# Patient Record
Sex: FEMALE | Race: WHITE | Employment: OTHER | URBAN - METROPOLITAN AREA
[De-identification: names, ages, dates, MRNs, and addresses within clinical notes are randomized per-mention and may not be internally consistent; named-entity substitution may affect disease eponyms.]

---

## 2017-01-03 ENCOUNTER — HOSPITAL ENCOUNTER (OUTPATIENT)
Dept: NON INVASIVE DIAGNOSTICS | Age: 72
Discharge: HOME | End: 2017-01-03
Admitting: INTERNAL MEDICINE

## 2017-01-03 DIAGNOSIS — R09.89 CAROTID BRUIT, UNSPECIFIED LATERALITY: ICD-10-CM

## 2017-01-03 DIAGNOSIS — R09.89 OTHER SPECIFIED SYMPTOMS AND SIGNS INVOLVING THE CIRCULATORY AND RESPIRATORY SYSTEMS: ICD-10-CM

## 2017-01-03 DIAGNOSIS — R53.1 RIGHT SIDED WEAKNESS: Primary | ICD-10-CM

## 2017-01-03 LAB
LV EF: 58 %
LVEF MODALITY: NORMAL

## 2017-03-23 ENCOUNTER — TELEPHONE (OUTPATIENT)
Dept: ORTHOPEDIC SURGERY | Age: 72
End: 2017-03-23

## 2017-03-23 PROBLEM — M25.562 CHRONIC PAIN OF LEFT KNEE: Status: ACTIVE | Noted: 2017-03-23

## 2017-03-23 PROBLEM — G89.29 CHRONIC PAIN OF LEFT KNEE: Status: ACTIVE | Noted: 2017-03-23

## 2017-03-23 PROBLEM — Z96.651 STATUS POST REVISION OF TOTAL REPLACEMENT OF RIGHT KNEE: Status: ACTIVE | Noted: 2017-03-23

## 2017-03-23 PROBLEM — M17.12 PRIMARY OSTEOARTHRITIS OF LEFT KNEE: Status: ACTIVE | Noted: 2017-03-23

## 2017-03-23 PROBLEM — Z96.651 STATUS POST TOTAL RIGHT KNEE REPLACEMENT: Status: ACTIVE | Noted: 2017-03-23

## 2017-03-23 PROBLEM — E66.01 MORBID OBESITY WITH BMI OF 40.0-44.9, ADULT (HCC): Status: ACTIVE | Noted: 2017-03-23

## 2017-03-28 ENCOUNTER — TELEPHONE (OUTPATIENT)
Dept: INTERNAL MEDICINE CLINIC | Age: 72
End: 2017-03-28

## 2017-03-30 ENCOUNTER — TELEPHONE (OUTPATIENT)
Dept: INTERNAL MEDICINE CLINIC | Age: 72
End: 2017-03-30

## 2017-03-30 ENCOUNTER — OFFICE VISIT (OUTPATIENT)
Dept: INTERNAL MEDICINE CLINIC | Age: 72
End: 2017-03-30

## 2017-03-30 VITALS
DIASTOLIC BLOOD PRESSURE: 63 MMHG | OXYGEN SATURATION: 99 % | HEART RATE: 66 BPM | SYSTOLIC BLOOD PRESSURE: 132 MMHG | TEMPERATURE: 96.2 F

## 2017-03-30 DIAGNOSIS — F32.5 MAJOR DEPRESSIVE DISORDER WITH SINGLE EPISODE, IN FULL REMISSION (HCC): ICD-10-CM

## 2017-03-30 DIAGNOSIS — E78.00 PURE HYPERCHOLESTEROLEMIA: ICD-10-CM

## 2017-03-30 DIAGNOSIS — E07.9 THYROID DISEASE: ICD-10-CM

## 2017-03-30 DIAGNOSIS — G31.85 CORTICOBASAL DEGENERATION (HCC): ICD-10-CM

## 2017-03-30 DIAGNOSIS — E66.01 MORBID OBESITY DUE TO EXCESS CALORIES (HCC): ICD-10-CM

## 2017-03-30 DIAGNOSIS — I25.10 CORONARY ARTERY DISEASE INVOLVING NATIVE CORONARY ARTERY OF NATIVE HEART WITHOUT ANGINA PECTORIS: ICD-10-CM

## 2017-03-30 DIAGNOSIS — Z00.00 PREVENTATIVE HEALTH CARE: ICD-10-CM

## 2017-03-30 DIAGNOSIS — G89.29 CHRONIC PAIN OF BOTH KNEES: ICD-10-CM

## 2017-03-30 DIAGNOSIS — M25.561 CHRONIC PAIN OF BOTH KNEES: ICD-10-CM

## 2017-03-30 DIAGNOSIS — M25.562 CHRONIC PAIN OF BOTH KNEES: ICD-10-CM

## 2017-03-30 DIAGNOSIS — Z12.31 ENCOUNTER FOR SCREENING MAMMOGRAM FOR MALIGNANT NEOPLASM OF BREAST: ICD-10-CM

## 2017-03-30 DIAGNOSIS — I10 ESSENTIAL HYPERTENSION: Primary | ICD-10-CM

## 2017-03-30 DIAGNOSIS — K21.9 GASTROESOPHAGEAL REFLUX DISEASE, ESOPHAGITIS PRESENCE NOT SPECIFIED: ICD-10-CM

## 2017-03-30 LAB
ALBUMIN SERPL-MCNC: 4.2 G/DL (ref 3.4–5)
ANION GAP SERPL CALCULATED.3IONS-SCNC: 12 MMOL/L (ref 3–16)
BASOPHILS ABSOLUTE: 0 K/UL (ref 0–0.2)
BASOPHILS RELATIVE PERCENT: 0.7 %
BUN BLDV-MCNC: 21 MG/DL (ref 7–20)
CALCIUM SERPL-MCNC: 9.1 MG/DL (ref 8.3–10.6)
CHLORIDE BLD-SCNC: 100 MMOL/L (ref 99–110)
CHOLESTEROL, TOTAL: 190 MG/DL (ref 0–199)
CO2: 29 MMOL/L (ref 21–32)
CREAT SERPL-MCNC: 0.7 MG/DL (ref 0.6–1.2)
EOSINOPHILS ABSOLUTE: 0.2 K/UL (ref 0–0.6)
EOSINOPHILS RELATIVE PERCENT: 2.9 %
GFR AFRICAN AMERICAN: >60
GFR NON-AFRICAN AMERICAN: >60
GLUCOSE BLD-MCNC: 113 MG/DL (ref 70–99)
HCT VFR BLD CALC: 41.2 % (ref 36–48)
HDLC SERPL-MCNC: 66 MG/DL (ref 40–60)
HEMOGLOBIN: 13.7 G/DL (ref 12–16)
HEPATITIS C ANTIBODY INTERPRETATION: NORMAL
LDL CHOLESTEROL CALCULATED: 85 MG/DL
LYMPHOCYTES ABSOLUTE: 1.6 K/UL (ref 1–5.1)
LYMPHOCYTES RELATIVE PERCENT: 23.9 %
MCH RBC QN AUTO: 30.4 PG (ref 26–34)
MCHC RBC AUTO-ENTMCNC: 33.3 G/DL (ref 31–36)
MCV RBC AUTO: 91.4 FL (ref 80–100)
MONOCYTES ABSOLUTE: 0.5 K/UL (ref 0–1.3)
MONOCYTES RELATIVE PERCENT: 7.1 %
NEUTROPHILS ABSOLUTE: 4.4 K/UL (ref 1.7–7.7)
NEUTROPHILS RELATIVE PERCENT: 65.4 %
PDW BLD-RTO: 13.8 % (ref 12.4–15.4)
PHOSPHORUS: 3.9 MG/DL (ref 2.5–4.9)
PLATELET # BLD: 232 K/UL (ref 135–450)
PMV BLD AUTO: 9.3 FL (ref 5–10.5)
POTASSIUM SERPL-SCNC: 4.8 MMOL/L (ref 3.5–5.1)
RBC # BLD: 4.51 M/UL (ref 4–5.2)
SODIUM BLD-SCNC: 141 MMOL/L (ref 136–145)
TRIGL SERPL-MCNC: 197 MG/DL (ref 0–150)
TSH SERPL DL<=0.05 MIU/L-ACNC: 2.33 UIU/ML (ref 0.27–4.2)
VLDLC SERPL CALC-MCNC: 39 MG/DL
WBC # BLD: 6.8 K/UL (ref 4–11)

## 2017-03-30 PROCEDURE — G0008 ADMIN INFLUENZA VIRUS VAC: HCPCS | Performed by: INTERNAL MEDICINE

## 2017-03-30 PROCEDURE — 99204 OFFICE O/P NEW MOD 45 MIN: CPT | Performed by: INTERNAL MEDICINE

## 2017-03-30 PROCEDURE — 90686 IIV4 VACC NO PRSV 0.5 ML IM: CPT | Performed by: INTERNAL MEDICINE

## 2017-03-30 RX ORDER — DONEPEZIL HYDROCHLORIDE 10 MG/1
5 TABLET, FILM COATED ORAL NIGHTLY
Qty: 45 TABLET | Refills: 1 | Status: SHIPPED | OUTPATIENT
Start: 2017-03-30 | End: 2017-07-31

## 2017-03-30 RX ORDER — DONEPEZIL HYDROCHLORIDE 10 MG/1
10 TABLET, FILM COATED ORAL NIGHTLY
Qty: 90 TABLET | Refills: 1 | Status: SHIPPED | OUTPATIENT
Start: 2017-03-30 | End: 2017-03-30 | Stop reason: DRUGHIGH

## 2017-03-30 RX ORDER — ELECTROLYTES/DEXTROSE
1 SOLUTION, ORAL ORAL DAILY
Qty: 90 TABLET | Refills: 1 | Status: SHIPPED | OUTPATIENT
Start: 2017-03-30 | End: 2017-10-24 | Stop reason: SDUPTHER

## 2017-03-30 RX ORDER — LEVOTHYROXINE SODIUM 112 UG/1
112 TABLET ORAL DAILY
Qty: 90 TABLET | Refills: 1 | Status: SHIPPED | OUTPATIENT
Start: 2017-03-30 | End: 2017-11-13 | Stop reason: SDUPTHER

## 2017-03-30 RX ORDER — ASPIRIN 81 MG/1
81 TABLET ORAL DAILY
Qty: 90 TABLET | Refills: 1 | Status: ON HOLD | OUTPATIENT
Start: 2017-03-30 | End: 2017-12-24 | Stop reason: HOSPADM

## 2017-03-30 RX ORDER — LISINOPRIL 40 MG/1
40 TABLET ORAL DAILY
Qty: 90 TABLET | Refills: 1 | Status: SHIPPED | OUTPATIENT
Start: 2017-03-30 | End: 2017-11-01 | Stop reason: SDUPTHER

## 2017-03-30 RX ORDER — MELOXICAM 15 MG/1
15 TABLET ORAL DAILY PRN
Qty: 90 TABLET | Refills: 1 | Status: SHIPPED | OUTPATIENT
Start: 2017-03-30 | End: 2017-05-01

## 2017-03-30 RX ORDER — CHOLECALCIFEROL (VITAMIN D3) 125 MCG
1 CAPSULE ORAL DAILY
Qty: 90 TABLET | Refills: 3 | Status: SHIPPED | OUTPATIENT
Start: 2017-03-30 | End: 2018-02-12 | Stop reason: SDUPTHER

## 2017-03-30 RX ORDER — GLUCOSAMINE HCL 500 MG
1 TABLET ORAL DAILY
Qty: 90 TABLET | Refills: 1 | Status: SHIPPED | OUTPATIENT
Start: 2017-03-30 | End: 2017-03-30 | Stop reason: DRUGHIGH

## 2017-03-30 RX ORDER — SOTALOL HYDROCHLORIDE 80 MG/1
80 TABLET ORAL DAILY
Qty: 90 TABLET | Refills: 1 | Status: SHIPPED | OUTPATIENT
Start: 2017-03-30 | End: 2017-08-30 | Stop reason: SDUPTHER

## 2017-03-30 RX ORDER — CALCIUM CARBONATE 200(500)MG
1 TABLET,CHEWABLE ORAL 2 TIMES DAILY WITH MEALS
Qty: 180 TABLET | Refills: 5 | Status: ON HOLD | OUTPATIENT
Start: 2017-03-30 | End: 2017-12-22 | Stop reason: CLARIF

## 2017-03-30 RX ORDER — ATORVASTATIN CALCIUM 20 MG/1
20 TABLET, FILM COATED ORAL DAILY
Qty: 90 TABLET | Refills: 1 | Status: SHIPPED | OUTPATIENT
Start: 2017-03-30 | End: 2017-05-15 | Stop reason: SDUPTHER

## 2017-03-30 RX ORDER — TRIAMTERENE AND HYDROCHLOROTHIAZIDE 37.5; 25 MG/1; MG/1
1 CAPSULE ORAL EVERY MORNING
Qty: 90 CAPSULE | Refills: 1 | Status: SHIPPED | OUTPATIENT
Start: 2017-03-30 | End: 2017-07-31 | Stop reason: ALTCHOICE

## 2017-03-30 RX ORDER — OMEGA-3S/DHA/EPA/FISH OIL/D3 300MG-1000
1 CAPSULE ORAL
Qty: 90 CAPSULE | Refills: 1 | Status: ON HOLD | OUTPATIENT
Start: 2017-03-30 | End: 2017-12-22 | Stop reason: CLARIF

## 2017-03-30 RX ORDER — DULOXETIN HYDROCHLORIDE 60 MG/1
60 CAPSULE, DELAYED RELEASE ORAL DAILY
Qty: 90 CAPSULE | Refills: 1 | Status: SHIPPED | OUTPATIENT
Start: 2017-03-30 | End: 2017-11-13 | Stop reason: SDUPTHER

## 2017-03-30 RX ORDER — ACETAMINOPHEN 500 MG
1000 TABLET ORAL EVERY 6 HOURS PRN
Qty: 225 TABLET | Refills: 3 | Status: SHIPPED | OUTPATIENT
Start: 2017-03-30 | End: 2018-02-12 | Stop reason: SDUPTHER

## 2017-03-30 RX ORDER — OMEPRAZOLE 20 MG/1
20 CAPSULE, DELAYED RELEASE ORAL DAILY
Qty: 90 CAPSULE | Refills: 1 | Status: SHIPPED | OUTPATIENT
Start: 2017-03-30 | End: 2017-10-27 | Stop reason: SDUPTHER

## 2017-04-03 ENCOUNTER — TELEPHONE (OUTPATIENT)
Dept: PAIN MANAGEMENT | Age: 72
End: 2017-04-03

## 2017-04-05 ENCOUNTER — HOSPITAL ENCOUNTER (OUTPATIENT)
Dept: MAMMOGRAPHY | Age: 72
Discharge: HOME | End: 2017-04-05
Attending: INTERNAL MEDICINE | Admitting: INTERNAL MEDICINE

## 2017-04-05 DIAGNOSIS — Z12.31 ENCOUNTER FOR SCREENING MAMMOGRAM FOR MALIGNANT NEOPLASM OF BREAST: ICD-10-CM

## 2017-04-05 DIAGNOSIS — Z00.00 PREVENTATIVE HEALTH CARE: ICD-10-CM

## 2017-04-13 ENCOUNTER — NURSE ONLY (OUTPATIENT)
Dept: INTERNAL MEDICINE CLINIC | Age: 72
End: 2017-04-13

## 2017-04-13 DIAGNOSIS — Z00.00 PREVENTATIVE HEALTH CARE: ICD-10-CM

## 2017-04-13 LAB
CONTROL: NEGATIVE
HEMOCCULT STL QL: NEGATIVE

## 2017-04-13 PROCEDURE — 82274 ASSAY TEST FOR BLOOD FECAL: CPT | Performed by: INTERNAL MEDICINE

## 2017-04-28 ENCOUNTER — TELEPHONE (OUTPATIENT)
Dept: ORTHOPEDIC SURGERY | Age: 72
End: 2017-04-28

## 2017-05-01 ENCOUNTER — OFFICE VISIT (OUTPATIENT)
Dept: PAIN MANAGEMENT | Age: 72
End: 2017-05-01

## 2017-05-01 VITALS
DIASTOLIC BLOOD PRESSURE: 78 MMHG | WEIGHT: 250 LBS | BODY MASS INDEX: 42.68 KG/M2 | HEIGHT: 64 IN | SYSTOLIC BLOOD PRESSURE: 135 MMHG | HEART RATE: 77 BPM

## 2017-05-01 DIAGNOSIS — M79.2 NEUROPATHIC PAIN: ICD-10-CM

## 2017-05-01 DIAGNOSIS — G89.4 CHRONIC PAIN SYNDROME: ICD-10-CM

## 2017-05-01 DIAGNOSIS — M17.12 PRIMARY OSTEOARTHRITIS OF LEFT KNEE: ICD-10-CM

## 2017-05-01 DIAGNOSIS — E66.9 OBESITY, UNSPECIFIED OBESITY SEVERITY, UNSPECIFIED OBESITY TYPE: ICD-10-CM

## 2017-05-01 PROCEDURE — 99204 OFFICE O/P NEW MOD 45 MIN: CPT | Performed by: INTERNAL MEDICINE

## 2017-05-01 RX ORDER — ISOSORBIDE MONONITRATE 30 MG/1
30 TABLET, EXTENDED RELEASE ORAL DAILY
COMMUNITY
End: 2017-05-24 | Stop reason: SDUPTHER

## 2017-05-01 RX ORDER — PREGABALIN 50 MG/1
CAPSULE ORAL
Qty: 90 CAPSULE | Refills: 0 | Status: SHIPPED | OUTPATIENT
Start: 2017-05-01 | End: 2017-05-01

## 2017-05-10 ENCOUNTER — TELEPHONE (OUTPATIENT)
Dept: INTERNAL MEDICINE CLINIC | Age: 72
End: 2017-05-10

## 2017-05-15 ENCOUNTER — OFFICE VISIT (OUTPATIENT)
Dept: CARDIOLOGY CLINIC | Age: 72
End: 2017-05-15

## 2017-05-15 VITALS
HEART RATE: 70 BPM | SYSTOLIC BLOOD PRESSURE: 130 MMHG | BODY MASS INDEX: 44.3 KG/M2 | DIASTOLIC BLOOD PRESSURE: 64 MMHG | HEIGHT: 63 IN | WEIGHT: 250 LBS

## 2017-05-15 DIAGNOSIS — E78.5 HYPERLIPIDEMIA, UNSPECIFIED HYPERLIPIDEMIA TYPE: ICD-10-CM

## 2017-05-15 DIAGNOSIS — I25.10 CORONARY ARTERY DISEASE INVOLVING NATIVE CORONARY ARTERY OF NATIVE HEART WITHOUT ANGINA PECTORIS: Primary | ICD-10-CM

## 2017-05-15 DIAGNOSIS — I10 ESSENTIAL HYPERTENSION: ICD-10-CM

## 2017-05-15 PROCEDURE — 93000 ELECTROCARDIOGRAM COMPLETE: CPT | Performed by: INTERNAL MEDICINE

## 2017-05-15 PROCEDURE — 99204 OFFICE O/P NEW MOD 45 MIN: CPT | Performed by: INTERNAL MEDICINE

## 2017-05-15 RX ORDER — ATORVASTATIN CALCIUM 40 MG/1
40 TABLET, FILM COATED ORAL DAILY
Qty: 60 TABLET | Refills: 3 | Status: SHIPPED | OUTPATIENT
Start: 2017-05-15 | End: 2017-11-13 | Stop reason: SDUPTHER

## 2017-05-24 RX ORDER — ISOSORBIDE MONONITRATE 30 MG/1
30 TABLET, EXTENDED RELEASE ORAL DAILY
Qty: 30 TABLET | Refills: 5 | Status: SHIPPED | OUTPATIENT
Start: 2017-05-24 | End: 2017-11-28 | Stop reason: SDUPTHER

## 2017-06-29 ENCOUNTER — TELEPHONE (OUTPATIENT)
Dept: INTERNAL MEDICINE CLINIC | Age: 72
End: 2017-06-29

## 2017-06-30 DIAGNOSIS — G83.9 SPASTIC PARALYSIS (HCC): Primary | ICD-10-CM

## 2017-07-31 ENCOUNTER — OFFICE VISIT (OUTPATIENT)
Dept: INTERNAL MEDICINE CLINIC | Age: 72
End: 2017-07-31

## 2017-07-31 ENCOUNTER — TELEPHONE (OUTPATIENT)
Dept: INTERNAL MEDICINE CLINIC | Age: 72
End: 2017-07-31

## 2017-07-31 VITALS — HEART RATE: 62 BPM | OXYGEN SATURATION: 94 % | SYSTOLIC BLOOD PRESSURE: 141 MMHG | DIASTOLIC BLOOD PRESSURE: 74 MMHG

## 2017-07-31 DIAGNOSIS — I10 ESSENTIAL HYPERTENSION: Primary | ICD-10-CM

## 2017-07-31 DIAGNOSIS — R05.9 COUGH: ICD-10-CM

## 2017-07-31 DIAGNOSIS — I50.32 CHRONIC DIASTOLIC CHF (CONGESTIVE HEART FAILURE) (HCC): ICD-10-CM

## 2017-07-31 PROCEDURE — 99214 OFFICE O/P EST MOD 30 MIN: CPT | Performed by: INTERNAL MEDICINE

## 2017-07-31 RX ORDER — SPIRONOLACTONE 25 MG/1
25 TABLET ORAL DAILY
Qty: 30 TABLET | Refills: 3 | Status: SHIPPED | OUTPATIENT
Start: 2017-07-31 | End: 2017-08-23

## 2017-07-31 RX ORDER — TORSEMIDE 20 MG/1
20 TABLET ORAL DAILY
Qty: 30 TABLET | Refills: 3 | Status: SHIPPED | OUTPATIENT
Start: 2017-07-31 | End: 2017-08-23

## 2017-07-31 ASSESSMENT — ENCOUNTER SYMPTOMS
SHORTNESS OF BREATH: 1
COUGH: 1
TROUBLE SWALLOWING: 1

## 2017-08-02 ENCOUNTER — TELEPHONE (OUTPATIENT)
Dept: INTERNAL MEDICINE CLINIC | Age: 72
End: 2017-08-02

## 2017-08-02 DIAGNOSIS — G31.85 CORTICOBASAL DEGENERATION (HCC): Primary | ICD-10-CM

## 2017-08-03 ENCOUNTER — HOSPITAL ENCOUNTER (OUTPATIENT)
Dept: OTHER | Age: 72
Discharge: HOME | End: 2017-08-03
Attending: INTERNAL MEDICINE | Admitting: INTERNAL MEDICINE

## 2017-08-03 DIAGNOSIS — R05.9 COUGH: ICD-10-CM

## 2017-08-04 ENCOUNTER — TELEPHONE (OUTPATIENT)
Dept: INTERNAL MEDICINE CLINIC | Age: 72
End: 2017-08-04

## 2017-08-04 DIAGNOSIS — G31.85 CORTICOBASAL DEGENERATION (HCC): Primary | ICD-10-CM

## 2017-08-08 ENCOUNTER — TELEPHONE (OUTPATIENT)
Dept: INTERNAL MEDICINE CLINIC | Age: 72
End: 2017-08-08

## 2017-08-22 ENCOUNTER — TELEPHONE (OUTPATIENT)
Dept: INTERNAL MEDICINE CLINIC | Age: 72
End: 2017-08-22

## 2017-08-23 ENCOUNTER — TELEPHONE (OUTPATIENT)
Dept: INTERNAL MEDICINE CLINIC | Age: 72
End: 2017-08-23

## 2017-08-23 RX ORDER — TRIAMTERENE AND HYDROCHLOROTHIAZIDE 37.5; 25 MG/1; MG/1
1 CAPSULE ORAL DAILY
Qty: 90 CAPSULE | Refills: 1 | Status: SHIPPED | OUTPATIENT
Start: 2017-08-23 | End: 2017-10-27 | Stop reason: SDUPTHER

## 2017-08-24 RX ORDER — CUSHION
1 EACH MISCELLANEOUS DAILY
Qty: 1 EACH | Refills: 0 | Status: SHIPPED | OUTPATIENT
Start: 2017-08-24

## 2017-08-30 RX ORDER — SOTALOL HYDROCHLORIDE 80 MG/1
TABLET ORAL
Qty: 90 TABLET | Refills: 1 | Status: SHIPPED | OUTPATIENT
Start: 2017-08-30 | End: 2017-11-13 | Stop reason: SDUPTHER

## 2017-09-08 ENCOUNTER — OFFICE VISIT (OUTPATIENT)
Dept: CARDIOLOGY CLINIC | Age: 72
End: 2017-09-08

## 2017-09-08 VITALS — SYSTOLIC BLOOD PRESSURE: 100 MMHG | HEART RATE: 75 BPM | DIASTOLIC BLOOD PRESSURE: 60 MMHG

## 2017-09-08 DIAGNOSIS — E66.01 MORBID OBESITY WITH BMI OF 40.0-44.9, ADULT (HCC): ICD-10-CM

## 2017-09-08 DIAGNOSIS — I25.10 CORONARY ARTERY DISEASE INVOLVING NATIVE CORONARY ARTERY OF NATIVE HEART WITHOUT ANGINA PECTORIS: ICD-10-CM

## 2017-09-08 DIAGNOSIS — G31.85 CORTICOBASAL DEGENERATION (HCC): ICD-10-CM

## 2017-09-08 DIAGNOSIS — I10 ESSENTIAL HYPERTENSION: ICD-10-CM

## 2017-09-08 DIAGNOSIS — R93.89 ABNORMAL CHEST X-RAY: Primary | ICD-10-CM

## 2017-09-08 PROCEDURE — 99215 OFFICE O/P EST HI 40 MIN: CPT | Performed by: INTERNAL MEDICINE

## 2017-09-13 ENCOUNTER — HOSPITAL ENCOUNTER (OUTPATIENT)
Dept: CT IMAGING | Age: 72
Discharge: HOME | End: 2017-09-13
Attending: INTERNAL MEDICINE | Admitting: INTERNAL MEDICINE

## 2017-09-13 DIAGNOSIS — R93.89 ABNORMAL CHEST X-RAY: ICD-10-CM

## 2017-09-13 DIAGNOSIS — R93.89 ABNORMAL FINDINGS ON DIAGNOSTIC IMAGING OF OTHER SPECIFIED BODY STRUCTURES: ICD-10-CM

## 2017-09-15 ENCOUNTER — TELEPHONE (OUTPATIENT)
Dept: CARDIOLOGY CLINIC | Age: 72
End: 2017-09-15

## 2017-10-03 DIAGNOSIS — M17.12 PRIMARY OSTEOARTHRITIS OF LEFT KNEE: Primary | ICD-10-CM

## 2017-10-04 ENCOUNTER — TELEPHONE (OUTPATIENT)
Dept: ORTHOPEDIC SURGERY | Age: 72
End: 2017-10-04

## 2017-10-10 NOTE — TELEPHONE ENCOUNTER
10/10/2017  SYNVISC-ONE   LEFT  KNEE. APPROVED EOC # [id number]. DATES: 11/12/2017 - 05/12/2018. 15 ANSEL[address] [facility] DEPT.   AP

## 2017-10-24 RX ORDER — DIPHENHYDRAMINE HCL 50 MG
CAPSULE ORAL
Qty: 90 TABLET | Refills: 1 | Status: SHIPPED | OUTPATIENT
Start: 2017-10-24

## 2017-10-27 ENCOUNTER — TELEPHONE (OUTPATIENT)
Dept: INTERNAL MEDICINE CLINIC | Age: 72
End: 2017-10-27

## 2017-10-27 RX ORDER — TRIAMTERENE AND HYDROCHLOROTHIAZIDE 37.5; 25 MG/1; MG/1
1 CAPSULE ORAL DAILY
Qty: 90 CAPSULE | Refills: 1 | Status: SHIPPED | OUTPATIENT
Start: 2017-10-27 | End: 2018-04-25 | Stop reason: SDUPTHER

## 2017-10-27 RX ORDER — OMEPRAZOLE 20 MG/1
20 CAPSULE, DELAYED RELEASE ORAL DAILY
Qty: 90 CAPSULE | Refills: 1 | Status: ON HOLD | OUTPATIENT
Start: 2017-10-27 | End: 2017-12-24 | Stop reason: HOSPADM

## 2017-10-27 NOTE — TELEPHONE ENCOUNTER
Fady Little is requesting that pt get orders for PT ,OT and Speech Therapy through [facility].   Please fax orders to [phone]

## 2017-10-27 NOTE — TELEPHONE ENCOUNTER
Luis Fernando Min is calling because pt is requesting a refill for Triam/hctz 37.5 /25 mg #90  And Omeprazole 20 mg #90  Luis Fernando Min stated that pt wants all meds called into [address] [phone]

## 2017-10-30 DIAGNOSIS — G31.85 CORTICOBASAL DEGENERATION (HCC): Primary | ICD-10-CM

## 2017-10-31 ENCOUNTER — OFFICE VISIT (OUTPATIENT)
Dept: INTERNAL MEDICINE CLINIC | Age: 72
End: 2017-10-31

## 2017-10-31 VITALS — DIASTOLIC BLOOD PRESSURE: 60 MMHG | HEART RATE: 76 BPM | RESPIRATION RATE: 20 BRPM | SYSTOLIC BLOOD PRESSURE: 112 MMHG

## 2017-10-31 DIAGNOSIS — R27.0 MOTOR ATAXIA: ICD-10-CM

## 2017-10-31 DIAGNOSIS — I10 ESSENTIAL HYPERTENSION: Primary | ICD-10-CM

## 2017-10-31 DIAGNOSIS — E66.01 MORBID OBESITY (HCC): ICD-10-CM

## 2017-10-31 DIAGNOSIS — G89.4 CHRONIC PAIN SYNDROME: ICD-10-CM

## 2017-10-31 PROCEDURE — G8484 FLU IMMUNIZE NO ADMIN: HCPCS | Performed by: INTERNAL MEDICINE

## 2017-10-31 PROCEDURE — 1123F ACP DISCUSS/DSCN MKR DOCD: CPT | Performed by: INTERNAL MEDICINE

## 2017-10-31 PROCEDURE — 90662 IIV NO PRSV INCREASED AG IM: CPT | Performed by: INTERNAL MEDICINE

## 2017-10-31 PROCEDURE — G8417 CALC BMI ABV UP PARAM F/U: HCPCS | Performed by: INTERNAL MEDICINE

## 2017-10-31 PROCEDURE — G8598 ASA/ANTIPLAT THER USED: HCPCS | Performed by: INTERNAL MEDICINE

## 2017-10-31 PROCEDURE — G0008 ADMIN INFLUENZA VIRUS VAC: HCPCS | Performed by: INTERNAL MEDICINE

## 2017-10-31 PROCEDURE — 99214 OFFICE O/P EST MOD 30 MIN: CPT | Performed by: INTERNAL MEDICINE

## 2017-10-31 PROCEDURE — 4040F PNEUMOC VAC/ADMIN/RCVD: CPT | Performed by: INTERNAL MEDICINE

## 2017-10-31 PROCEDURE — G8400 PT W/DXA NO RESULTS DOC: HCPCS | Performed by: INTERNAL MEDICINE

## 2017-10-31 PROCEDURE — 3017F COLORECTAL CA SCREEN DOC REV: CPT | Performed by: INTERNAL MEDICINE

## 2017-10-31 PROCEDURE — 1036F TOBACCO NON-USER: CPT | Performed by: INTERNAL MEDICINE

## 2017-10-31 PROCEDURE — G8427 DOCREV CUR MEDS BY ELIG CLIN: HCPCS | Performed by: INTERNAL MEDICINE

## 2017-10-31 PROCEDURE — 1090F PRES/ABSN URINE INCON ASSESS: CPT | Performed by: INTERNAL MEDICINE

## 2017-10-31 PROCEDURE — 3014F SCREEN MAMMO DOC REV: CPT | Performed by: INTERNAL MEDICINE

## 2017-10-31 RX ORDER — DONEPEZIL HYDROCHLORIDE 10 MG/1
10 TABLET, FILM COATED ORAL NIGHTLY
Qty: 90 TABLET | Refills: 2 | Status: SHIPPED | OUTPATIENT
Start: 2017-10-31 | End: 2018-01-16 | Stop reason: SDUPTHER

## 2017-10-31 ASSESSMENT — ENCOUNTER SYMPTOMS
TROUBLE SWALLOWING: 1
SHORTNESS OF BREATH: 0

## 2017-10-31 NOTE — PROGRESS NOTES
Subjective:      Patient ID: Kj Momin is a 67 y.o. female. HTN and Hypothyroid f/u, she feels good, her breathing is good, she wants to try the Aricept again because she is having word finding difficulty        Review of Systems   Constitutional: Negative for appetite change and fever. HENT: Positive for trouble swallowing. Respiratory: Negative for shortness of breath. Cardiovascular: Negative. Endocrine: Negative. Genitourinary: Negative. Musculoskeletal: Positive for gait problem. Negative for arthralgias and joint swelling. Skin: Negative for rash. Allergic/Immunologic: Negative for immunocompromised state. Neurological: Positive for speech difficulty and weakness. Hematological: Negative. Psychiatric/Behavioral: Negative for sleep disturbance. Past Medical History:   Diagnosis Date    Acid reflux     CAD (coronary artery disease)     Corticobasal degeneration     Depression     Hyperlipidemia     Hypertension     MI, old     Thyroid disease        I personally reviewed active meds and allergies with the patient today      Objective:   Physical Exam   Constitutional: She is oriented to person, place, and time. She appears well-developed. She does not have a sickly appearance. No distress. Very obese habitus   HENT:   Head: Normocephalic and atraumatic. Eyes: No scleral icterus. Neck: No JVD present. Cardiovascular: Normal rate, regular rhythm and normal heart sounds. Exam reveals no gallop. No murmur heard. Pulmonary/Chest: Effort normal and breath sounds normal. No respiratory distress. She has no wheezes. She has no rales. Musculoskeletal: She exhibits no edema. Neurological: She is alert and oriented to person, place, and time. She displays tremor. She exhibits abnormal muscle tone. Coordination and gait abnormal.   Skin: Skin is warm and dry. No rash noted. She is not diaphoretic. Psychiatric: She has a normal mood and affect.  Her behavior is

## 2017-11-01 ENCOUNTER — TELEPHONE (OUTPATIENT)
Dept: INTERNAL MEDICINE CLINIC | Age: 72
End: 2017-11-01

## 2017-11-01 RX ORDER — LISINOPRIL 40 MG/1
40 TABLET ORAL DAILY
Qty: 90 TABLET | Refills: 1 | Status: ON HOLD | OUTPATIENT
Start: 2017-11-01 | End: 2017-12-22 | Stop reason: CLARIF

## 2017-11-03 ENCOUNTER — TELEPHONE (OUTPATIENT)
Dept: INTERNAL MEDICINE CLINIC | Age: 72
End: 2017-11-03

## 2017-11-06 NOTE — TELEPHONE ENCOUNTER
Left message for call back from [facility] @ [phone] and for Akua Vaughan as order for home health services has been faxed twice and I have spoken with Keisha at this facility as well.

## 2017-11-07 ENCOUNTER — TELEPHONE (OUTPATIENT)
Dept: INTERNAL MEDICINE CLINIC | Age: 72
End: 2017-11-07

## 2017-11-07 NOTE — TELEPHONE ENCOUNTER
Dariela Shah is PT and she saw pt today and did her evaluation. The OT is there also doing her Eval.  She would like to get orders for both to see pt 2 times a week for 9 wks. Please call her at phone# provided.

## 2017-11-13 ENCOUNTER — TELEPHONE (OUTPATIENT)
Dept: INTERNAL MEDICINE CLINIC | Age: 72
End: 2017-11-13

## 2017-11-13 DIAGNOSIS — I25.10 CORONARY ARTERY DISEASE INVOLVING NATIVE CORONARY ARTERY OF NATIVE HEART WITHOUT ANGINA PECTORIS: ICD-10-CM

## 2017-11-13 RX ORDER — SOTALOL HYDROCHLORIDE 80 MG/1
TABLET ORAL
Qty: 90 TABLET | Refills: 2 | Status: ON HOLD | OUTPATIENT
Start: 2017-11-13 | End: 2017-12-22 | Stop reason: CLARIF

## 2017-11-13 RX ORDER — DULOXETIN HYDROCHLORIDE 60 MG/1
60 CAPSULE, DELAYED RELEASE ORAL DAILY
Qty: 90 CAPSULE | Refills: 2 | Status: SHIPPED | OUTPATIENT
Start: 2017-11-13 | End: 2018-07-18 | Stop reason: SDUPTHER

## 2017-11-13 RX ORDER — LEVOTHYROXINE SODIUM 112 UG/1
112 TABLET ORAL DAILY
Qty: 90 TABLET | Refills: 2 | Status: SHIPPED | OUTPATIENT
Start: 2017-11-13 | End: 2018-07-18 | Stop reason: SDUPTHER

## 2017-11-13 RX ORDER — ATORVASTATIN CALCIUM 40 MG/1
40 TABLET, FILM COATED ORAL DAILY
Qty: 180 TABLET | Refills: 2 | Status: SHIPPED | OUTPATIENT
Start: 2017-11-13 | End: 2018-09-17 | Stop reason: SDUPTHER

## 2017-11-13 NOTE — TELEPHONE ENCOUNTER
Jeanette Miranda is a  for [facility] and she is trying to help pt's daughter with getting PA for pt's Nerve Block. She needs to know what doctor is doing the nerve block her daughter was unable to tell her.

## 2017-11-13 NOTE — TELEPHONE ENCOUNTER
Patient has been having excessive drooling and Grace, from [facility] is requesting a prescription for scopalamine patch. Please send prescription to [facility].     Davi Herrera:  [phone]

## 2017-11-13 NOTE — TELEPHONE ENCOUNTER
Pt needs refill of DULoxetine (CYMBALTA) 60 MG extended release capsule [[id number]]      sotalol (BETAPACE) 80 MG tablet [[id number]      atorvastatin (LIPITOR) 40 MG tablet [[id number]]      levothyroxine (SYNTHROID) 112 MCG tablet [[id number]]    [facility] - [address] -  [phone]

## 2017-11-15 ENCOUNTER — TELEPHONE (OUTPATIENT)
Dept: INTERNAL MEDICINE CLINIC | Age: 72
End: 2017-11-15

## 2017-11-15 NOTE — TELEPHONE ENCOUNTER
I would prefer not to use medications for this problem because they all reduce memory, but if Marifer Later is terribly disturbed by the secretions, the safest medication is Atropine eye drops, 2 drops under the tongue every 6 hrs as needed, of all the treatments available, this has the least brain side effects, however, it is absolutely essential that she NOT be allowed to self administer this medication, because accidental overdosing can cause severe reactions    Cynthia Mosquera  11/14/2017

## 2017-11-16 ENCOUNTER — TELEPHONE (OUTPATIENT)
Dept: INTERNAL MEDICINE CLINIC | Age: 72
End: 2017-11-16

## 2017-11-16 RX ORDER — ATROPINE SULFATE 10 MG/ML
SOLUTION/ DROPS OPHTHALMIC
Qty: 15 ML | Refills: 0 | Status: SHIPPED | OUTPATIENT
Start: 2017-11-16 | End: 2018-02-12 | Stop reason: ALTCHOICE

## 2017-11-16 NOTE — TELEPHONE ENCOUNTER
Fady Gomez from [facility] called to request 's MA give her a call regarding markell. Miki Olivo has had fluid filled blisters on her left foot, and stated they are getting worse. Please call Fady Gomez at [phone]. Thank you.

## 2017-11-17 ENCOUNTER — OFFICE VISIT (OUTPATIENT)
Dept: ORTHOPEDIC SURGERY | Age: 72
End: 2017-11-17

## 2017-11-17 VITALS
HEIGHT: 64 IN | DIASTOLIC BLOOD PRESSURE: 76 MMHG | SYSTOLIC BLOOD PRESSURE: 134 MMHG | WEIGHT: 250 LBS | BODY MASS INDEX: 42.68 KG/M2 | HEART RATE: 72 BPM

## 2017-11-17 DIAGNOSIS — Z96.651 STATUS POST REVISION OF TOTAL REPLACEMENT OF RIGHT KNEE: ICD-10-CM

## 2017-11-17 DIAGNOSIS — G89.29 CHRONIC PAIN OF LEFT KNEE: ICD-10-CM

## 2017-11-17 DIAGNOSIS — E66.01 MORBID OBESITY WITH BMI OF 40.0-44.9, ADULT (HCC): ICD-10-CM

## 2017-11-17 DIAGNOSIS — M25.562 CHRONIC PAIN OF LEFT KNEE: ICD-10-CM

## 2017-11-17 DIAGNOSIS — M17.12 PRIMARY OSTEOARTHRITIS OF LEFT KNEE: Primary | ICD-10-CM

## 2017-11-17 DIAGNOSIS — Z96.651 STATUS POST TOTAL RIGHT KNEE REPLACEMENT: ICD-10-CM

## 2017-11-17 PROCEDURE — 99999 PR OFFICE/OUTPT VISIT,PROCEDURE ONLY: CPT | Performed by: ORTHOPAEDIC SURGERY

## 2017-11-17 PROCEDURE — 20610 DRAIN/INJ JOINT/BURSA W/O US: CPT | Performed by: ORTHOPAEDIC SURGERY

## 2017-11-17 NOTE — PROGRESS NOTES
Integumentary, CardioPulmonary, Neurological focused) by intake and observation. All past and current ROS forms have been scanned into the medical record. She has been instructed to contact her primary care physician regarding ROS issues if not already being addressed at this time. There are no recent changes. The most recent ROS was scanned into media on 5/1/2017. Objective:   Physical Exam  Vital Signs:  /76   Pulse 72   Ht 5' 3.5\" (1.613 m)   Wt 250 lb (113.4 kg)   BMI 43.59 kg/m²     Constitution:  Generally, Theron Oakley is [x] alert, [x] appears stated age, and [x] in no distress. Her general body habitus is [] Cachectic  [] Thin  [] Normal  [] Obese  [x] Morbidly Obese    Head: [x] Normocephalic  Eyes: [x] Extra-occular muscles intact   [x]  Wears glasses  Left Ear: [x] External Ear normal   Right Ear: [x] External Ear normal   Nose: [x] Normal  Mouth: [x] Oral mucosa moist  [x] No perioral lesions    Pulmonary: [x] Respirations unlabored and regular  Skin: [x] Warm [x] Well perfused     Psychiatric:   [x] Good judgement and insight  [x] Oriented to [x] person, [x] place, and [x] time. [x] Mood appropriate for circumstances. Gait:   Gait is [] Normal  [x] Impaired in a wheelchair  Assistive Device: [] None  [] Knee Brace  [] [address]  [] Crutches   [] Walker   [x] Wheelchair  [] Other     ORTHOPAEDIC KNEE EXAM:   []  RIGHT     [x]  LEFT     []  BILATERAL   Inspection:  [x] Skin intact without abrasion or lacerations.   [x] Ecchymosis:  [x] none  [] mild  [] moderate  [] severe  [x] Atrophy:  [x] none  [] mild  [] moderate  [] severe  [x] Effusion: [x] none  [] mild  [] moderate  [] severe  [] Scar / Surgical incision(s): [] A-Scope Portals  [] Open Surgical Incision(s)    Alignment:  [x] Normal  [] Varus [] Valgus    Range of Motion:  [] Normal Knee ROM         [] Deferred: acute injury/post-surgery/pain   [x] Limited ROM:     Palpation:   [] No Tenderness  [x] Tenderness: Anterior [x] mild [] moderate  [] severe   [x] Patellofemoral Crepitation:  [] none  [x] mild  [] moderate  [] severe    Motor Function:   [] No gross motor weakness  [x] Motor weakness: Generalized [] mild  [] moderate  [x] severe     Neurologic:  [x] Sensation to light touch intact   [x] Coordination / proprioception intact    Circulation:  [x] The limb is warm and well perfused  [x] Capillary refill is intact. [x] Edema  [x] none  [] mild  [] moderate  [] severe  [x] Venous stasis changes  [x] none  [] mild  [] moderate  [] severe    Data Reviewed:     No imaging studies were obtained today. XRays:  XRays: left knee/hip dated 1/20/17 shows:  Impression:        Pelvis/left hip: Osteopenia with no definitive fracture seen.        Left knee: No acute fracture. Tricompartmental osteoarthritis   worse medially.        Left tibia/fibula: No acute fracture.        Left ankle: Findings suggestive of a remote ligamentous injury,   likely involving both the syndesmotic as well as the deltoid   ligaments as evidenced by osseous proliferation along the medial   malleolus as well as widening of the distal tibiofibular   syndesmosis and mild elevation in the ankle mortise.        Lucency is identified in the medial aspect of the talar dome which   could relate to a remote osteochondral injury or subchondral cysts   due to underlying osteoarthritis.           PROCEDURE NOTE: LEFT KNEE SYNVISC ONE INJECTION  11/17/2017 at 11:53 AM   Procedure: Synvisc One Injection (6 ml)  Verbal consent was obtained. Risks and benefits were explained. Questions were encouraged and answered.       Timeout Verification Completed including:    Correct patient: Nitish Clements was identified    Correct procedure    Correct site & side    Correct equipment and supplies    Staff member: Sushma Reilly MD     Assistant: Anna Davis     The injection site (superolateral) was prepped with Chlora-Prep using aseptic technique and a left knee intra-articular REMINDER:   Carry a list of your medications and allergies with you at all times. Call your pharmacy and our office at least 1 week in advance to refill prescriptions. Narcotic medications will not be refilled after hours or on weekends. ATTENTION    As of October 2, 2014, the [facility] ([address] [facility]) has mandated that ALL PRESCRIPTION PAIN MEDICINE cannot be called into your pharmacy. This includes:    Oxycodone (Percocet)  Hydrocodone (Vicodin, Norco)  Tramadol (Ultram)  Other    These medications must have prescriptions which are written and signed by your doctor (Dr. Madeline Vera). This means that you must call ahead and come in to the office to  the paper prescription and take it to your pharmacy. We are sorry for any inconvenience but this is now the law. Isaura Carranza MD  Board Certified Orthopaedic Surgeon  Knee and Shoulder Surgery Specialist    Contact Information:  Clarke Palacio,   [phone], Option 3         IMPORTANT NARCOTIC INFORMATION: PLEASE READ     [x] DO NOT share your prescription medication with anyone! Sharing is illegal.  The prescription dose is based on your age, weight, and health problems. Sharing your narcotic prescription can lead to accidental death of the individual for which the prescription was not prescribed. You may not know about his/her addiction problem. [x] Always use the same pharmacy when filling your narcotic prescriptions. [x] DO NOT mix your narcotic prescription with alcohol. Mixing the narcotic prescription medication with alcohol causes depressive effects including breathing problems, organ malfunction, and cardiac arrest.     [x] Always keep your narcotic medication in a locked, secured location. Keep your medication private. This is to avoid individuals from taking your medication without your knowledge.   This medication is highly sought after and locking your prescriptions will protect office note. If so, please bring any errors to my attention for an addendum. All efforts were made to ensure that this office note is accurate. I have personally performed and/or participated in the history, physical exam and medical decision making and reviewed all pertinent clinical information unless otherwise noted.

## 2017-11-17 NOTE — PROGRESS NOTES
SYNVISC ONE INJECTION    NDC# [address]-26  Lot # [id number]  Exp: 5/2020  SITE: Arleth Chandler Knee     LIDOCAINE    NDC# 0027-2212-02  Lot # [id number]  Exp. 7/1/2018    We provided the Synvisc medication.

## 2017-11-17 NOTE — PATIENT INSTRUCTIONS
Lexi Rosales was instructed to apply ice to the injection area for 15 - 20 minutes several times a day to decrease pain and and swelling. Ice (\"ICE IS YOUR FRIEND\": try using a bag of frozen peas or corn) for 15  20 minutes 3 x day. Limit activities today. You may resume normal activities tomorrow if you have no pain. For severe pain:  If after hours, she is to go to Emergency Room. During office hours she must come in to the office. Lexi Rosales was instructed to call the office if there are any questions or concerns related to her condition. I have asked her to schedule a follow-up appointment for 6-8 weeks from now for re-evaluation and possible repeat injection. She is specifically instructed to contact the office between now & her scheduled appointment if she has concerns related to her condition. She is welcome to call for an appointment sooner if she has any additional concerns or questions. General Medication Instructions:  Any prescriptions must be used as directed. If you have any concerns, questions or require refills, please contact our office. If you experience any adverse reactions, stop the medication and call our office immediately. If you designate a preferred pharmacy, appropriate prescriptions will be sent to your preferred pharmacy for pickup to be use as directed. Patient Driving Instructions:  No driving if you are taking narcotic pain medications or muscle relaxers. PATIENT REMINDER:   Carry a list of your medications and allergies with you at all times. Call your pharmacy and our office at least 1 week in advance to refill prescriptions. Narcotic medications will not be refilled after hours or on weekends. ATTENTION    As of October 2, 2014, the [facility] ([address] [facility]) has mandated that ALL PRESCRIPTION PAIN MEDICINE cannot be called into your pharmacy.     This includes:    Oxycodone (Percocet)  Hydrocodone (Vicodin, provided the following link that may be helpful for you if you would like more information on your Orthopaedic condition:    www. Orthoinfo. org         If you or someone you know struggles with weight issues and joint pain, please check out this important documentary:      \"Start Moving Start Living\" =  Http://startmovingstartliving.Stampt       (YOUTUBE video SMSL FULL SD)           FALL PREVENTION:  SIMPLE TIPS    Vitamin D3:  Over-the-counter supplement of Vitamin D3, (at least 2,000 IU daily). Vitamin D3 is widely available without a prescription at pharmacies and buying clubs ([facility], [facility]) and on-line at sites such as Amazon.com. It comes in a variety of formulations (tablets, gelcaps, liquid) and doses (1,000 IU, 2,000 IU, 4,000 IU, 5,000 IU and even higher). The right dose for most people is 2,000 IU per day but higher doses are sometimes needed. You can take your vitamin D3 at any time of the day, with or without food, with or without calcium. Because vitamin D is long acting, if you miss your vitamin D on one day you can double up the dose on a later day. Exercise: Low impact exercise programs such as Dorian Chi. Chair Raise Exercise. Yoga. Adult fitness classes at the  or community center. Physical Therapy: Formal physical therapy program to strengthen your lower extremities, improve your balance and gait. Home Assessment: Remove clutter and tripping hazards: loose/throw rugs, cords or cables. Install or placement of railings, grab bars around steps/stairs and in the bathroom (toilet, bath tub, sink). Improve lighting. Foot Wear:  Stable, well fitting. Avoid loose straps or ties, slippery soles. Vision/Eye Check Up: Have your vision checked yearly. Resources:  www.cdc.gov/injury/STEADI    1). STEADI: Stay Independent Self Risk Assessment    2). CDC Handouts:  How to prevent falls, Home Safety Fall Prevention         If you or someone you know struggles with weight issues and joint

## 2017-11-17 NOTE — LETTER
FAXED/SENT TO Dr Nevaeh Jensen MD  11/17/17, 11:56 AM        Anthony Mendoza MD  Orthopaedic Surgery & Sports Medicine  Knee & Shoulder Specialist      Dear Dr Nevaeh Jensen MD,    Thank you very much for your referral of Ms. Irlanda Sheth to me for evaluation and treatment. Attached below is my report and recommendations from Madison Cole most recent office visit. I appreciate your confidence in me and thank you for allowing me the opportunity to care for your patients. If I can be of any further assistance to you on this or any other patient, please do not hesitate to contact me. Sincerely,    Anthony Mendoza MD  Board Certified Orthopaedic Surgeon  Knee and Shoulder Surgery Specialist  Electronically signed by Anthony Mendoza MD on 11/17/2017 at 11:56 AM     Contact Information:  Artie Summers,   [phone], Option 3                             I have personally performed and/or participated in the history, physical exam and medical decision making and reviewed all pertinent clinical information unless otherwise noted.         Anthony Mendoza MD  Orthopaedic Surgery & Sports Medicine  Knee & Shoulder Specialist       [facility] OFFICE  [address] [place] [address], 2nd Floor  [address] [facility]    [phone] Option 3   [phone] Option 3  [phone] (fax)    [phone] (fax)       PATIENT: Irlanda Sheth    67 y.o.  female  [facility]: 1945   MRN:  [id number]       Date of current encounter: 11/17/2017  This encounter is evaluated as a:        New Patient Visit     Established Patient Visit    Post-Op Visit      Consult: requested by          Worker's Comp       Patient's PCP is Dr. Nevaeh Jensen MD    Subjective:     Reason for Visit: left knee injection: Synvisc One    Chief Complaint   Patient presents with    Knee Pain     ongoing evaluation and treatment of left knee        HPI: [place] is a 67y.o. year old,  female complaining of left knee pain. She states that her left knee pain is a 10 out of 10 pain with activity and is 0 out of 10 pain at rest.  She is here for Synvisc 1 injection in her left knee. PAIN ASSESSMENT:   Pain Assessment  Location of Pain: Knee  Location Modifiers: Left  Severity of Pain: 10  Quality of Pain: Throbbing, Sharp, Dull, Aching, Locking, Grinding, Popping, Buckling, Cracking  Duration of Pain: Persistent  Frequency of Pain: Constant  Date Pain First Started:  (ongoing for years)  Aggravating Factors: Bending, Stretching, Straightening, Exercise, Walking, Standing  Limiting Behavior: Yes  Relieving Factors:  Other (Comment), Rest (tylenol)  Result of Injury: No  Work-Related Injury: No  Are there other pain locations you wish to document?: No    Patient Active Problem List   Diagnosis    Status post total right knee replacement in [place]    Status post revision of total replacement of right knee 2012    Chronic pain of left knee    Primary osteoarthritis of left knee    Morbid obesity with BMI of 40.0-44.9, adult ([address]    Corticobasal degeneration    Thyroid disease    CAD (coronary artery disease)    Acid reflux    Essential hypertension    Hyperlipidemia    Depression    Chronic pain of both knees    Morbid obesity due to excess calories (HCC)    Chronic pain syndrome    Osteoarthritis of left knee    Obesity    Neuropathic pain    Motor ataxia     Past Medical History:   Diagnosis Date    Acid reflux     CAD (coronary artery disease)     Corticobasal degeneration     Depression     Hyperlipidemia     Hypertension     MI, old     Thyroid disease      Past Surgical History:   Procedure Laterality Date    ABDOMEN SURGERY      CORONARY ANGIOPLASTY WITH STENT PLACEMENT      1997 for MI, Palmaz-Agnieszka, RCA    JOINT REPLACEMENT Right 01/2009    TKR    KNEE SURGERY Right 2012    TKR revision acetaminophen (TYLENOL) 500 MG tablet Take 2 tablets by mouth every 6 hours as needed for Pain  Bertin Moore MD   Cholecalciferol (VITAMIN D3) 2000 UNITS TABS Take 1 tablet by mouth daily  Bertin Moore MD   Magnesium Gluconate (MAGNESIUM 27 PO) Take by mouth  Historical Provider, MD   Nutritional Supplements (NITRO-PRO PO) Take by mouth  Historical Provider, MD   [facility] Natural Products (OSTEO BI-FLEX ADV DOUBLE ST PO) Take by mouth  Historical Provider, MD     Social History     Social History    Marital status:      Spouse name: N/A    Number of children: N/A    Years of education: N/A     Occupational History    Not on file. Social History Main Topics    Smoking status: Former Smoker     Years: 45.00     Quit date: 1997    Smokeless tobacco: Never Used    Alcohol use No    Drug use: No    Sexual activity: Not on file     Other Topics Concern    Not on file     Social History Narrative    No narrative on file     No family history on file. Review of Systems (ROS):    Performed. Lyndsey Matute's review of systems has been performed (Musculoskeletal, Integumentary, CardioPulmonary, Neurological focused) by intake and observation. All past and current ROS forms have been scanned into the medical record. She has been instructed to contact her primary care physician regarding ROS issues if not already being addressed at this time. There are no recent changes. The most recent ROS was scanned into media on 5/1/2017. Objective:   Physical Exam  Vital Signs:  /76   Pulse 72   Ht 5' 3.5\" (1.613 m)   Wt 250 lb (113.4 kg)   BMI 43.59 kg/m²      Constitution:  Generally, Antione Kessler is  alert,  appears stated age, and  in no distress.   Her general body habitus is  Cachectic   Thin   Normal   Obese   Morbidly Obese    Head:  Normocephalic  Eyes:  Extra-occular muscles intact     Wears glasses  Left Ear:  External Ear normal   Right Ear:  External Ear normal   Nose:  Normal Mouth:  Oral mucosa moist   No perioral lesions    Pulmonary:  Respirations unlabored and regular  Skin:  Warm  Well perfused     Psychiatric:    Good judgement and insight   Oriented to  person,  place, and  time. Mood appropriate for circumstances. Gait:   Gait is  Normal   Impaired in a wheelchair  Assistive Device:  None   Knee Brace   Cane   Crutches    Walker    Wheelchair   Other     ORTHOPAEDIC KNEE EXAM:     RIGHT       LEFT       BILATERAL   Inspection:   Skin intact without abrasion or lacerations. Ecchymosis:   none   mild   moderate   severe   Atrophy:   none   mild   moderate   severe   Effusion:  none   mild   moderate   severe   Scar / Surgical incision(s): A-Scope Portals   Open Surgical Incision(s)    Alignment:   Normal   Varus  Valgus    Range of Motion:   Normal Knee ROM          Deferred: acute injury/post-surgery/pain    Limited ROM:     Palpation:    No Tenderness   Tenderness: Anterior  mild   moderate   severe    Patellofemoral Crepitation:   none   mild   moderate   severe    Motor Function:    No gross motor weakness   Motor weakness: Generalized  mild   moderate   severe     Neurologic:   Sensation to light touch intact    Coordination / proprioception intact    Circulation:   The limb is warm and well perfused   Capillary refill is intact. Edema   none   mild   moderate   severe   Venous stasis changes   none   mild   moderate   severe    Data Reviewed:     No imaging studies were obtained today. XRays:  XRays: left knee/hip dated 1/20/17 shows:  Impression:        Pelvis/left hip: Osteopenia with no definitive fracture seen.        Left knee: No acute fracture.  Tricompartmental osteoarthritis   worse medially.        Left tibia/fibula: No acute fracture.        Left ankle: Findings suggestive of a remote ligamentous injury,   likely involving both the syndesmotic as well as the deltoid   ligaments as evidenced by osseous proliferation along the medial malleolus as well as widening of the distal tibiofibular   syndesmosis and mild elevation in the ankle mortise.        Lucency is identified in the medial aspect of the talar dome which   could relate to a remote osteochondral injury or subchondral cysts   due to underlying osteoarthritis.           PROCEDURE NOTE: LEFT KNEE SYNVISC ONE INJECTION  11/17/2017 at 11:53 AM   Procedure: Synvisc One Injection (6 ml)  Verbal consent was obtained. Risks and benefits were explained. Questions were encouraged and answered. Timeout Verification Completed including:    Correct patient: Jos Cano was identified    Correct procedure    Correct site & side    Correct equipment and supplies    Staff member: Ngozi Hull MD     Assistant: Tess Anne     The injection site (superolateral) was prepped with Chlora-Prep using aseptic technique and a left knee intra-articular knee injection was performed with ethyl chloride & 1% lidocaine (2 ml) for anesthetic. SYNVISC ONE (6 ml) was injected. A sterile adhesive dressing was applied. Post procedure:  Jos Cano tolerated the treatment well. Instructions to patient:  Appropriate post injections instructions were given to Jos Cano. Assessment (Medical Decision Making): Jos Caon is a 67y.o. year old female with the following diagnosis:    1. Primary osteoarthritis of left knee  DC SYNVISC OR SYNVISC-ONE    DC ARTHROCENTESIS ASPIR&/INJ MAJOR JT/BURSA W/O US   2. Chronic pain of left knee     3. Status post total right knee replacement in [place]     4. Status post revision of total replacement of right knee 2012     5.  Morbid obesity with BMI of 40.0-44.9, adult ([address]         Her overall course:         Responding adequately to ongoing treatment      Worsening despite conservative treatment      Unchanged despite conservative treatment    Plan (Medical Decision Making): office if there are any questions or concerns related to her condition. I have asked her to schedule a follow-up appointment for 6-8 weeks from now for re-evaluation and possible repeat injection. She is specifically instructed to contact the office between now & her scheduled appointment if she has concerns related to her condition. She is welcome to call for an appointment sooner if she has any additional concerns or questions. General Medication Instructions:  Any prescriptions must be used as directed. If you have any concerns, questions or require refills, please contact our office. If you experience any adverse reactions, stop the medication and call our office immediately. If you designate a preferred pharmacy, appropriate prescriptions will be sent to your preferred pharmacy for pickup to be use as directed. Patient Driving Instructions:  No driving if you are taking narcotic pain medications or muscle relaxers. PATIENT REMINDER:   Carry a list of your medications and allergies with you at all times. Call your pharmacy and our office at least 1 week in advance to refill prescriptions. Narcotic medications will not be refilled after hours or on weekends. ATTENTION    As of October 2, 2014, the [facility] ([facility]) has mandated that ALL PRESCRIPTION PAIN MEDICINE cannot be called into your pharmacy. This includes:    Oxycodone (Percocet)  Hydrocodone (Vicodin, Norco)  Tramadol (Ultram)  Other    These medications must have prescriptions which are written and signed by your doctor (Dr. Alma Delia Lakhani). This means that you must call ahead and come in to the office to  the paper prescription and take it to your pharmacy. We are sorry for any inconvenience but this is now the law.     Jodie Yeager MD  Board Certified Orthopaedic Surgeon  Knee and Shoulder Surgery Specialist    Contact Information:  [address],  [phone], Option 3         IMPORTANT NARCOTIC INFORMATION: PLEASE READ      DO NOT share your prescription medication with anyone! Sharing is illegal.  The prescription dose is based on your age, weight, and health problems. Sharing your narcotic prescription can lead to accidental death of the individual for which the prescription was not prescribed. You may not know about his/her addiction problem. Always use the same pharmacy when filling your narcotic prescriptions. DO NOT mix your narcotic prescription with alcohol. Mixing the narcotic prescription medication with alcohol causes depressive effects including breathing problems, organ malfunction, and cardiac arrest.      Always keep your narcotic medication in a locked, secured location. Keep your medication private. This is to avoid individuals from taking your medication without your knowledge. This medication is highly sought after and locking your prescriptions will protect you from being a target of crime. DO NOT stock pile your medication. This also will protect you from being a target of crime. Dispose of any unused medications properly. Do not flush the medications. Look for appropriate waste collection programs in your community and drug take back events. DO NOT drive while taking narcotic pain medication or muscle relaxers. FOR MORE INFORMATION, CHECK OUT THIS RESOURCE    For your convenience, Dr. Marylene Chalk has provided the following link that may be helpful for you if you would like more information on your Orthopaedic condition:    www. Orthoinfo. org         If you or someone you know struggles with weight issues and joint pain, please check out this important documentary:      \"Start Moving Start Living\" =  Http://[employer]       (YOUTUBE video SMSL FULL SD)           FALL PREVENTION:  SIMPLE TIPS    Vitamin D3:  Over-the-counter supplement of Vitamin D3, (at least 2,000 IU

## 2017-11-20 DIAGNOSIS — G89.4 CHRONIC PAIN SYNDROME: Primary | ICD-10-CM

## 2017-11-20 NOTE — TELEPHONE ENCOUNTER
referral placed.  She will need to call and schedule with the clinic / physician of her choice    Joo Bergman  11/20/2017

## 2017-11-20 NOTE — TELEPHONE ENCOUNTER
Daughter informed referral place, once they decide who they will see referral will be faxed.  Understands that Dr Nilsa Pendleton will not order any type of procedure this will need to come from specialist who will make that decision

## 2017-11-28 RX ORDER — ISOSORBIDE MONONITRATE 30 MG/1
30 TABLET, EXTENDED RELEASE ORAL DAILY
Qty: 90 TABLET | Refills: 2 | Status: SHIPPED | OUTPATIENT
Start: 2017-11-28 | End: 2018-08-27 | Stop reason: SDUPTHER

## 2017-12-07 ENCOUNTER — TELEPHONE (OUTPATIENT)
Dept: INTERNAL MEDICINE CLINIC | Age: 72
End: 2017-12-07

## 2017-12-11 ENCOUNTER — TELEPHONE (OUTPATIENT)
Dept: INTERNAL MEDICINE CLINIC | Age: 72
End: 2017-12-11

## 2017-12-18 ENCOUNTER — TELEPHONE (OUTPATIENT)
Dept: INTERNAL MEDICINE CLINIC | Age: 72
End: 2017-12-18

## 2017-12-20 ENCOUNTER — TELEPHONE (OUTPATIENT)
Dept: INTERNAL MEDICINE CLINIC | Age: 72
End: 2017-12-20

## 2017-12-20 DIAGNOSIS — R27.0 MOTOR ATAXIA: Primary | ICD-10-CM

## 2017-12-20 DIAGNOSIS — G31.85 CORTICOBASAL DEGENERATION (HCC): ICD-10-CM

## 2017-12-20 NOTE — TELEPHONE ENCOUNTER
Albina Mcgovern, caregiver called to request refill request on Omega 3 Fatty Acids 1200 mg cap.          [facility], [address]

## 2017-12-20 NOTE — TELEPHONE ENCOUNTER
Wes Griggs called to check on the status on Nursing PT and OT orders that she requested back on 12/18/17. She understands they were approved but she has still not received them.      FAX: [phone]

## 2017-12-22 PROBLEM — R07.9 CHEST PAIN: Status: ACTIVE | Noted: 2017-12-22

## 2017-12-23 PROBLEM — O88.211 PULMONARY EMBOLISM AFFECTING PREGNANCY IN FIRST TRIMESTER: Status: ACTIVE | Noted: 2017-12-23

## 2017-12-24 PROBLEM — Z96.651 STATUS POST TOTAL RIGHT KNEE REPLACEMENT: Status: RESOLVED | Noted: 2017-03-23 | Resolved: 2017-12-24

## 2017-12-24 PROBLEM — O88.211 PULMONARY EMBOLISM AFFECTING PREGNANCY IN FIRST TRIMESTER: Status: RESOLVED | Noted: 2017-12-23 | Resolved: 2017-12-24

## 2017-12-26 ENCOUNTER — TELEPHONE (OUTPATIENT)
Dept: INTERNAL MEDICINE CLINIC | Age: 72
End: 2017-12-26

## 2018-01-16 ENCOUNTER — TELEPHONE (OUTPATIENT)
Dept: INTERNAL MEDICINE CLINIC | Age: 73
End: 2018-01-16

## 2018-01-16 RX ORDER — DONEPEZIL HYDROCHLORIDE 10 MG/1
10 TABLET, FILM COATED ORAL NIGHTLY
Qty: 90 TABLET | Refills: 3 | Status: SHIPPED | OUTPATIENT
Start: 2018-01-16 | End: 2018-09-17 | Stop reason: ALTCHOICE

## 2018-02-09 ENCOUNTER — TELEPHONE (OUTPATIENT)
Dept: INTERNAL MEDICINE CLINIC | Age: 73
End: 2018-02-09

## 2018-02-09 NOTE — TELEPHONE ENCOUNTER
Zev Vann is a PT and she is calling to inform Dr Anil Mahoney that pt was not feeling well today so she cancelled her PT appt today.

## 2018-02-12 ENCOUNTER — OFFICE VISIT (OUTPATIENT)
Dept: INTERNAL MEDICINE CLINIC | Age: 73
End: 2018-02-12

## 2018-02-12 VITALS
SYSTOLIC BLOOD PRESSURE: 110 MMHG | RESPIRATION RATE: 16 BRPM | DIASTOLIC BLOOD PRESSURE: 60 MMHG | TEMPERATURE: 98.6 F | HEART RATE: 80 BPM

## 2018-02-12 DIAGNOSIS — J11.1 BRONCHITIS WITH INFLUENZA: Primary | ICD-10-CM

## 2018-02-12 DIAGNOSIS — E78.2 MIXED HYPERLIPIDEMIA: ICD-10-CM

## 2018-02-12 DIAGNOSIS — E07.9 THYROID DISEASE: ICD-10-CM

## 2018-02-12 DIAGNOSIS — M17.12 PRIMARY OSTEOARTHRITIS OF LEFT KNEE: ICD-10-CM

## 2018-02-12 DIAGNOSIS — Z00.00 PREVENTATIVE HEALTH CARE: ICD-10-CM

## 2018-02-12 LAB
INFLUENZA A ANTIBODY: POSITIVE
INFLUENZA B ANTIBODY: NEGATIVE

## 2018-02-12 PROCEDURE — 4040F PNEUMOC VAC/ADMIN/RCVD: CPT | Performed by: INTERNAL MEDICINE

## 2018-02-12 PROCEDURE — 3017F COLORECTAL CA SCREEN DOC REV: CPT | Performed by: INTERNAL MEDICINE

## 2018-02-12 PROCEDURE — 1036F TOBACCO NON-USER: CPT | Performed by: INTERNAL MEDICINE

## 2018-02-12 PROCEDURE — 1123F ACP DISCUSS/DSCN MKR DOCD: CPT | Performed by: INTERNAL MEDICINE

## 2018-02-12 PROCEDURE — G8417 CALC BMI ABV UP PARAM F/U: HCPCS | Performed by: INTERNAL MEDICINE

## 2018-02-12 PROCEDURE — 99214 OFFICE O/P EST MOD 30 MIN: CPT | Performed by: INTERNAL MEDICINE

## 2018-02-12 PROCEDURE — 1090F PRES/ABSN URINE INCON ASSESS: CPT | Performed by: INTERNAL MEDICINE

## 2018-02-12 PROCEDURE — 87804 INFLUENZA ASSAY W/OPTIC: CPT | Performed by: INTERNAL MEDICINE

## 2018-02-12 PROCEDURE — G8427 DOCREV CUR MEDS BY ELIG CLIN: HCPCS | Performed by: INTERNAL MEDICINE

## 2018-02-12 PROCEDURE — 3014F SCREEN MAMMO DOC REV: CPT | Performed by: INTERNAL MEDICINE

## 2018-02-12 PROCEDURE — G8484 FLU IMMUNIZE NO ADMIN: HCPCS | Performed by: INTERNAL MEDICINE

## 2018-02-12 PROCEDURE — G8400 PT W/DXA NO RESULTS DOC: HCPCS | Performed by: INTERNAL MEDICINE

## 2018-02-12 RX ORDER — ACETAMINOPHEN 500 MG
1000 TABLET ORAL EVERY 6 HOURS PRN
Qty: 250 TABLET | Refills: 3 | Status: SHIPPED | OUTPATIENT
Start: 2018-02-12 | End: 2019-08-20 | Stop reason: CLARIF

## 2018-02-12 RX ORDER — OSELTAMIVIR PHOSPHATE 75 MG/1
75 CAPSULE ORAL 2 TIMES DAILY
Qty: 10 CAPSULE | Refills: 0 | Status: SHIPPED | OUTPATIENT
Start: 2018-02-12 | End: 2018-02-12 | Stop reason: SDUPTHER

## 2018-02-12 RX ORDER — OSELTAMIVIR PHOSPHATE 75 MG/1
75 CAPSULE ORAL 2 TIMES DAILY
Qty: 10 CAPSULE | Refills: 0 | Status: SHIPPED | OUTPATIENT
Start: 2018-02-12 | End: 2018-02-21 | Stop reason: HOSPADM

## 2018-02-12 RX ORDER — CHOLECALCIFEROL (VITAMIN D3) 125 MCG
1 CAPSULE ORAL DAILY
Qty: 90 TABLET | Refills: 3 | Status: SHIPPED | OUTPATIENT
Start: 2018-02-12 | End: 2019-08-20 | Stop reason: CLARIF

## 2018-02-12 ASSESSMENT — ENCOUNTER SYMPTOMS
SHORTNESS OF BREATH: 0
WHEEZING: 0
SINUS PAIN: 0
COUGH: 1
NAUSEA: 0
RHINORRHEA: 1
SWOLLEN GLANDS: 0
VOMITING: 0
SORE THROAT: 0
TROUBLE SWALLOWING: 1
DIARRHEA: 0
ABDOMINAL PAIN: 0

## 2018-02-13 NOTE — PROGRESS NOTES
Subjective:      Patient ID: Diana Davis is a 67 y.o. female. HTN and Hypothyroid f/u, new cough x 4 days getting worse, also now on Xarelto for PE since 2 mths ago, she had a sick contact recently      URI    This is a new problem. The current episode started in the past 7 days. The problem has been rapidly worsening. There has been no fever. Associated symptoms include congestion, coughing, joint pain and rhinorrhea. Pertinent negatives include no abdominal pain, chest pain, diarrhea, dysuria, headaches, joint swelling, nausea, neck pain, rash, sinus pain, sore throat, swollen glands, vomiting or wheezing. She has tried decongestant for the symptoms. The treatment provided no relief. Review of Systems   Constitutional: Negative for activity change, appetite change, chills, fever and unexpected weight change. HENT: Positive for congestion, rhinorrhea and trouble swallowing. Negative for nosebleeds, sinus pain and sore throat. Respiratory: Positive for cough. Negative for shortness of breath and wheezing. Cardiovascular: Negative. Negative for chest pain. Gastrointestinal: Negative for abdominal pain, diarrhea, nausea and vomiting. Endocrine: Negative. Negative for polydipsia and polyphagia. Genitourinary: Negative. Negative for dysuria. Musculoskeletal: Positive for arthralgias, gait problem and joint pain. Negative for joint swelling and neck pain. Skin: Negative for rash. Allergic/Immunologic: Negative for immunocompromised state. Neurological: Positive for speech difficulty and weakness. Negative for headaches. Hematological: Negative for adenopathy. Bruises/bleeds easily. Psychiatric/Behavioral: Negative for sleep disturbance.      Past Medical History:   Diagnosis Date    Acid reflux     CAD (coronary artery disease)     Corticobasal degeneration     Depression     Hyperlipidemia     Hypertension     MI, old     Thyroid disease        I personally reviewed active meds and allergies with the patient today      Objective:   Physical Exam   Constitutional: She is oriented to person, place, and time. She appears well-developed. She does not have a sickly appearance. No distress. Very obese habitus   HENT:   Head: Normocephalic and atraumatic. Mouth/Throat: Oropharynx is clear and moist. No oropharyngeal exudate. Eyes: Conjunctivae are normal. No scleral icterus. Neck: No JVD present. No tracheal deviation present. Cardiovascular: Normal rate, regular rhythm and normal heart sounds. Exam reveals no gallop. No murmur heard. Pulmonary/Chest: Effort normal. No stridor. No respiratory distress. She has no wheezes. She has rales (left base). Abdominal: Soft. There is no tenderness. Musculoskeletal: She exhibits no edema. Lymphadenopathy:     She has no cervical adenopathy. Neurological: She is alert and oriented to person, place, and time. She displays tremor. She exhibits abnormal muscle tone. Coordination and gait abnormal.   Skin: Skin is warm and dry. No rash noted. She is not diaphoretic. Psychiatric: She has a normal mood and affect. Her behavior is normal. Her speech is slurred. Vitals reviewed. /60   Pulse 80   Temp 98.6 °F (37 °C) (Axillary)   Resp 16      POCT Influenza positive A+ B    Assessment:           ICD-10-CM ICD-9-CM    1. Bronchitis with influenza J11.1 487.1 POCT Influenza A/B   2. Preventative health care Z00.00 V70.0 LIPID PANEL      TSH without Reflex   3. Primary osteoarthritis of left knee M17.12 715.16    4. Thyroid disease E07.9 246.9    5.  Mixed hyperlipidemia E78.2 272.2             Plan:       maintain good hydration  Tamiflu, cough meds qhs only  Call back if symptoms worsen  Check labs and continue other meds      Orders Placed This Encounter   Procedures    LIPID PANEL    TSH without Reflex    POCT Influenza A/B       Current Outpatient Prescriptions   Medication Sig Dispense Refill    sotalol (BETAPACE) 40

## 2018-02-14 DIAGNOSIS — J11.1 INFLUENZAL BRONCHITIS: Primary | ICD-10-CM

## 2018-02-15 ENCOUNTER — TELEPHONE (OUTPATIENT)
Dept: INTERNAL MEDICINE CLINIC | Age: 73
End: 2018-02-15

## 2018-02-15 NOTE — TELEPHONE ENCOUNTER
Radha with [facility] called with xray results. Has been faxed twice to us.  Results read as:     \"Modest cardiomegaly with clear lungs\"    If any questions please call back at [phone]

## 2018-02-16 ENCOUNTER — TELEPHONE (OUTPATIENT)
Dept: INTERNAL MEDICINE CLINIC | Age: 73
End: 2018-02-16

## 2018-02-16 NOTE — TELEPHONE ENCOUNTER
Brayan Gabriel from [address] calling and states that she saw patient today and she has bruising on her umbilical area, flanks and side. Claribel was not there this past Sunday and she wasn't sure if there was a PT/INR that should be ordered. Please advise.

## 2018-02-18 PROBLEM — T14.8XXA HEMATOMA: Status: ACTIVE | Noted: 2018-02-18

## 2018-02-20 PROBLEM — I26.09 OTHER PULMONARY EMBOLISM WITH ACUTE COR PULMONALE (HCC): Status: ACTIVE | Noted: 2017-12-23

## 2018-02-22 ENCOUNTER — TELEPHONE (OUTPATIENT)
Dept: PHARMACY | Facility: CLINIC | Age: 73
End: 2018-02-22

## 2018-02-22 NOTE — TELEPHONE ENCOUNTER
Received referral from Dr. Michelle Palacios to manage warfarin therapy. Called and lvm for patient to schedule first appointment with [facility] on 03/05 or 03/07 after beginning warfarin on 03/02. Wilmer Mathew.  Cris Desai, RPT, [facility]  [facility]ication Management Clinic  [address] Ph: [phone]  [facility] Ph: [phone]  2/22/2018 11:32 AM

## 2018-02-23 ENCOUNTER — TELEPHONE (OUTPATIENT)
Dept: INTERNAL MEDICINE CLINIC | Age: 73
End: 2018-02-23

## 2018-02-23 RX ORDER — LIDOCAINE 50 MG/G
1 PATCH TOPICAL DAILY
Qty: 30 PATCH | Refills: 0 | Status: SHIPPED | OUTPATIENT
Start: 2018-02-23 | End: 2018-09-17 | Stop reason: SDUPTHER

## 2018-02-23 NOTE — TELEPHONE ENCOUNTER
Zay Davis is a RN and he stated that he saw pt at home today. Pt is stable at home and vital signs are fine. Pt did complain of lower extremity pain. Legs warm color fine. Pt has hx of blood clots and he would like to know what is the doctor's Anti-Coag plan ? Cedric Morse Pt is currently on aspirin  He did notice that on discharge summary from the  hospital they felt pt should be on Coumadin. Please call with orders for the following Skilled Nursing  3x a week for 4 wks  2x a week for 2wks   2x a week for 2 wks  Orders for OT and PT eval and treat   Please call if any questions.

## 2018-02-26 ENCOUNTER — TELEPHONE (OUTPATIENT)
Dept: INTERNAL MEDICINE CLINIC | Age: 73
End: 2018-02-26

## 2018-02-26 ENCOUNTER — OFFICE VISIT (OUTPATIENT)
Dept: INTERNAL MEDICINE CLINIC | Age: 73
End: 2018-02-26

## 2018-02-26 VITALS
SYSTOLIC BLOOD PRESSURE: 130 MMHG | TEMPERATURE: 97.8 F | HEART RATE: 72 BPM | DIASTOLIC BLOOD PRESSURE: 66 MMHG | RESPIRATION RATE: 16 BRPM

## 2018-02-26 DIAGNOSIS — I10 ESSENTIAL HYPERTENSION: ICD-10-CM

## 2018-02-26 DIAGNOSIS — G31.85 CORTICOBASAL DEGENERATION (HCC): ICD-10-CM

## 2018-02-26 DIAGNOSIS — S30.1XXD ABDOMINAL WALL HEMATOMA, SUBSEQUENT ENCOUNTER: Primary | ICD-10-CM

## 2018-02-26 DIAGNOSIS — R27.0 MOTOR ATAXIA: ICD-10-CM

## 2018-02-26 DIAGNOSIS — I26.99 OTHER ACUTE PULMONARY EMBOLISM WITHOUT ACUTE COR PULMONALE (HCC): ICD-10-CM

## 2018-02-26 PROCEDURE — G8598 ASA/ANTIPLAT THER USED: HCPCS | Performed by: INTERNAL MEDICINE

## 2018-02-26 PROCEDURE — 1123F ACP DISCUSS/DSCN MKR DOCD: CPT | Performed by: INTERNAL MEDICINE

## 2018-02-26 PROCEDURE — G8427 DOCREV CUR MEDS BY ELIG CLIN: HCPCS | Performed by: INTERNAL MEDICINE

## 2018-02-26 PROCEDURE — 99213 OFFICE O/P EST LOW 20 MIN: CPT | Performed by: INTERNAL MEDICINE

## 2018-02-26 PROCEDURE — G8400 PT W/DXA NO RESULTS DOC: HCPCS | Performed by: INTERNAL MEDICINE

## 2018-02-26 PROCEDURE — 3017F COLORECTAL CA SCREEN DOC REV: CPT | Performed by: INTERNAL MEDICINE

## 2018-02-26 PROCEDURE — 1111F DSCHRG MED/CURRENT MED MERGE: CPT | Performed by: INTERNAL MEDICINE

## 2018-02-26 PROCEDURE — 1036F TOBACCO NON-USER: CPT | Performed by: INTERNAL MEDICINE

## 2018-02-26 PROCEDURE — G8484 FLU IMMUNIZE NO ADMIN: HCPCS | Performed by: INTERNAL MEDICINE

## 2018-02-26 PROCEDURE — 3014F SCREEN MAMMO DOC REV: CPT | Performed by: INTERNAL MEDICINE

## 2018-02-26 PROCEDURE — G8417 CALC BMI ABV UP PARAM F/U: HCPCS | Performed by: INTERNAL MEDICINE

## 2018-02-26 PROCEDURE — 4040F PNEUMOC VAC/ADMIN/RCVD: CPT | Performed by: INTERNAL MEDICINE

## 2018-02-26 PROCEDURE — 1090F PRES/ABSN URINE INCON ASSESS: CPT | Performed by: INTERNAL MEDICINE

## 2018-02-26 RX ORDER — ALBUTEROL SULFATE 90 UG/1
2 AEROSOL, METERED RESPIRATORY (INHALATION) EVERY 6 HOURS PRN
Qty: 1 INHALER | Refills: 3 | Status: SHIPPED | OUTPATIENT
Start: 2018-02-26 | End: 2019-08-20 | Stop reason: CLARIF

## 2018-02-26 ASSESSMENT — ENCOUNTER SYMPTOMS
TROUBLE SWALLOWING: 1
SHORTNESS OF BREATH: 0
WHEEZING: 1

## 2018-02-26 NOTE — TELEPHONE ENCOUNTER
Called and lvm for patient to schedule first appointment with [facility] on 03/05 or 03/07 after beginning warfarin on 03/02. Luma Donovan.  JODY Duong, CP  Medication Management Clinic  [facility] Ph: [phone]  [facility] Ph: [phone]  2/26/2018 10:11 AM

## 2018-02-27 NOTE — PROGRESS NOTES
Subjective:      Patient ID: Marland Runner is a 67 y.o. female. Follow for new spontaneous hematoma in the rectus muscle, she was on Xarelto 20 and her blood count dropped from 13 to 10 g, she was in severe pain and hospitalized, she has no other signs of bleeding and her pain has now resolved, her breathing is improving but she is still wheezing, she is eating well,         Review of Systems   Constitutional: Negative for activity change, appetite change, chills, fever and unexpected weight change. HENT: Positive for trouble swallowing. Negative for nosebleeds. Respiratory: Positive for wheezing. Negative for shortness of breath. Cardiovascular: Negative. Negative for chest pain. Endocrine: Negative. Negative for polydipsia and polyphagia. Genitourinary: Negative. Musculoskeletal: Positive for arthralgias and gait problem. Negative for joint swelling. Allergic/Immunologic: Negative for immunocompromised state. Neurological: Positive for speech difficulty and weakness. Hematological: Negative for adenopathy. Bruises/bleeds easily. Psychiatric/Behavioral: Negative for sleep disturbance. Past Medical History:   Diagnosis Date    Acid reflux     CAD (coronary artery disease)     Corticobasal degeneration     Depression     Hyperlipidemia     Hypertension     MI, old     Thyroid disease        I personally reviewed active meds and allergies with the patient today      Objective:   Physical Exam   Constitutional: She is oriented to person, place, and time. She appears well-developed. She does not have a sickly appearance. No distress. Very obese habitus   HENT:   Head: Normocephalic and atraumatic. Mouth/Throat: Oropharynx is clear and moist. No oropharyngeal exudate. Eyes: Conjunctivae are normal. No scleral icterus. Neck: No JVD present. No tracheal deviation present. Cardiovascular: Normal rate, regular rhythm and normal heart sounds. Exam reveals no gallop.     No

## 2018-02-28 ENCOUNTER — TELEPHONE (OUTPATIENT)
Dept: INTERNAL MEDICINE CLINIC | Age: 73
End: 2018-02-28

## 2018-02-28 NOTE — TELEPHONE ENCOUNTER
Per patient and her daughter's request, order for patient lift for home faxed to [address] @ [phone].

## 2018-02-28 NOTE — TELEPHONE ENCOUNTER
Called and spoke with pts daughter. She stated that her mother will not be starting warfarin, her PCP decided to put her on a low dose of xarelto. Amanda Rivera.  Elvis Mccall, RPT, CPhT  Medication Management Clinic  [facility] Ph: [phone]  [facility] Ph: [phone]  2/28/2018 10:11 AM

## 2018-03-01 DIAGNOSIS — R27.0 MOTOR ATAXIA: ICD-10-CM

## 2018-03-01 DIAGNOSIS — G31.85 CORTICOBASAL DEGENERATION (HCC): Primary | ICD-10-CM

## 2018-03-02 ENCOUNTER — OFFICE VISIT (OUTPATIENT)
Dept: CARDIOLOGY CLINIC | Age: 73
End: 2018-03-02

## 2018-03-02 VITALS
SYSTOLIC BLOOD PRESSURE: 126 MMHG | HEIGHT: 63 IN | DIASTOLIC BLOOD PRESSURE: 62 MMHG | HEART RATE: 69 BPM | WEIGHT: 249 LBS | OXYGEN SATURATION: 96 % | BODY MASS INDEX: 44.12 KG/M2

## 2018-03-02 DIAGNOSIS — I26.09 OTHER ACUTE PULMONARY EMBOLISM WITH ACUTE COR PULMONALE (HCC): Primary | ICD-10-CM

## 2018-03-02 DIAGNOSIS — G31.85 CORTICOBASAL DEGENERATION (HCC): ICD-10-CM

## 2018-03-02 DIAGNOSIS — S30.1XXD HEMATOMA OF RECTUS SHEATH, SUBSEQUENT ENCOUNTER: ICD-10-CM

## 2018-03-02 DIAGNOSIS — E66.01 MORBID OBESITY WITH BMI OF 40.0-44.9, ADULT (HCC): ICD-10-CM

## 2018-03-02 PROCEDURE — 1090F PRES/ABSN URINE INCON ASSESS: CPT | Performed by: INTERNAL MEDICINE

## 2018-03-02 PROCEDURE — G8427 DOCREV CUR MEDS BY ELIG CLIN: HCPCS | Performed by: INTERNAL MEDICINE

## 2018-03-02 PROCEDURE — 99214 OFFICE O/P EST MOD 30 MIN: CPT | Performed by: INTERNAL MEDICINE

## 2018-03-02 PROCEDURE — 3014F SCREEN MAMMO DOC REV: CPT | Performed by: INTERNAL MEDICINE

## 2018-03-02 PROCEDURE — G8400 PT W/DXA NO RESULTS DOC: HCPCS | Performed by: INTERNAL MEDICINE

## 2018-03-02 PROCEDURE — G8484 FLU IMMUNIZE NO ADMIN: HCPCS | Performed by: INTERNAL MEDICINE

## 2018-03-02 PROCEDURE — 1036F TOBACCO NON-USER: CPT | Performed by: INTERNAL MEDICINE

## 2018-03-02 PROCEDURE — G8598 ASA/ANTIPLAT THER USED: HCPCS | Performed by: INTERNAL MEDICINE

## 2018-03-02 PROCEDURE — 3017F COLORECTAL CA SCREEN DOC REV: CPT | Performed by: INTERNAL MEDICINE

## 2018-03-02 PROCEDURE — 1111F DSCHRG MED/CURRENT MED MERGE: CPT | Performed by: INTERNAL MEDICINE

## 2018-03-02 PROCEDURE — 4040F PNEUMOC VAC/ADMIN/RCVD: CPT | Performed by: INTERNAL MEDICINE

## 2018-03-02 PROCEDURE — 1123F ACP DISCUSS/DSCN MKR DOCD: CPT | Performed by: INTERNAL MEDICINE

## 2018-03-02 PROCEDURE — G8417 CALC BMI ABV UP PARAM F/U: HCPCS | Performed by: INTERNAL MEDICINE

## 2018-03-02 NOTE — PROGRESS NOTES
Resident Follow up Note    S: 67year old female comes for a follow up. She has seen Dr. Clyde Pickering last year. History of MI with BMS placed in 1997, HTN/HLD. She was recently started on eliquis in December of 2017 secondary to bilateral PEs, then switched to [facility]. She developed a hematoma of the rectus abdominus muscle and had to be taken off all AC. She was told to restart [facility] after 3/2 per heme/onc. She is here for a follow up with Dr. Clyde Pickering. Previous stress test was performed last year after developing some chest pain, trops negative, cardiology consulted in 12/17. Today the vitals are stable /62. Weight 249. She reports that she does has some problems sleeping at night, Keeps HOB elevated to 50 degrees to sleep. She denies any SOB at rest, however, she is wheelchair bound so difficult to assess exertion. No headaches, vision changes, no palpitations, no leg pain, no swelling noticed along the legs.      Past Medical History:   Diagnosis Date    Acid reflux     CAD (coronary artery disease)     Corticobasal degeneration     Depression     Hyperlipidemia     Hypertension     MI, old     Thyroid disease      Past Surgical History:   Procedure Laterality Date    ABDOMEN SURGERY      CORONARY ANGIOPLASTY WITH STENT PLACEMENT      1997 for MI, Palmaz-Agnieszka, RCA    JOINT REPLACEMENT Right 01/2009    TKR    KNEE SURGERY Right 2012    TKR revision    TONSILLECTOMY       Social History   Substance Use Topics    Smoking status: Former Smoker     Years: 45.00     Quit date: 1997    Smokeless tobacco: Never Used    Alcohol use No     Allergies   Allergen Reactions    Aricept [Donepezil Hcl]      constipation    Augmentin [Amoxicillin-Pot Clavulanate] Other (See Comments)     Flare    Ciprofloxacin Other (See Comments)     tendinitis    Dynacirc [Isradipine] Swelling    Spironolactone      Cramps and thirst    Biotin Diarrhea, Nausea Only, Palpitations and Other (See Comments)     A-Fib    Mobic [Meloxicam] Nausea And Vomiting     No family history on file. Review of Systems   General: No fevers, chills, fatigue, or night sweats. No abnormal changes in weight. HEENT: No blurry or decreased vision. No changes in hearing, nasal discharge or sore throat. Cardiovascular: See HPI. No cramping in legs or buttocks when walking. Respiratory: Denies SOB  Gastrointestinal: No abdominal pain, hematochezia, melana, or history of GI ulcers. Genito-Urinary: No dysuria or hematuria. No urgency or polyuria. Musculoskeletal: No complaints of joint pain, joint swelling or muscular weakness/soreness. Neurological: R arm paralyzed, uses a wheelchair. Speech difficulty. All from corticobasal degeneration  Psychological: No anxiety or depression  Hematological and Lymphatic: No abnormal bleeding or bruising, blood clots, jaundice. Endocrine: No malaise/lethargy, palpitations, polydipsia/polyuria, temperature intolerance or unexpected weight changes. Skin: No rashes or non-healing ulcers. PE:  Blood pressure 126/62, pulse 69, height 5' 3\" (1.6 m), weight 249 lb (112.9 kg), SpO2 96 %, not currently breastfeeding. General:  Alert and oriented x 3. No acute distress. HEENT:  EOMI, anicteric, no trauma  Neck:  Supple. No LAD. Heart:  RRR.  Heart sounds are distant  Lungs:  Clear to auscultation bilaterally  Abd:  S/NT/ND/No peritoneal signs  Ext:  No c/c. 1+ pitting edema  Skin:  No rashes    Lab Results   Component Value Date    WBC 9.2 02/21/2018    HGB 10.6 (L) 02/21/2018    HCT 31.3 (L) 02/21/2018    MCV 95.2 02/21/2018     02/21/2018       Chemistry        Component Value Date/Time     (L) 02/21/2018 0817    K 3.6 02/21/2018 0817    CL 95 (L) 02/21/2018 0817    CO2 28 02/21/2018 0817    BUN 12 02/21/2018 0817    CREATININE <0.5 (L) 02/21/2018 0817        Component Value Date/Time    CALCIUM 8.6 02/21/2018 0817    ALKPHOS 74 10/31/2016 0930    AST 14 10/31/2016 0930    ALT 12 10/31/2016 0930 tablet by mouth every morning (before breakfast) 30 tablet 3    Calcium Carb-Cholecalciferol (CALCIUM + D3 PO) Take 1 each by mouth daily      Omega-3 Fatty Acids (FISH OIL) 600 MG CAPS Take 1 each by mouth daily      FIBER ADULT GUMMIES PO Take 1 each by mouth daily      isosorbide mononitrate (IMDUR) 30 MG extended release tablet Take 1 tablet by mouth daily 90 tablet 2    levothyroxine (SYNTHROID) 112 MCG tablet Take 1 tablet by mouth daily 90 tablet 2    atorvastatin (LIPITOR) 40 MG tablet Take 1 tablet by mouth daily 180 tablet 2    DULoxetine (CYMBALTA) 60 MG extended release capsule Take 1 capsule by mouth daily 90 capsule 2    triamterene-hydrochlorothiazide (DYAZIDE) 37.5-25 MG per capsule Take 1 capsule by mouth daily 90 capsule 1    Multiple Vitamins-Minerals (EQ ONE DAILY Optoro) TABS TAKE ONE TABLET BY MOUTH ONCE DAILY 90 tablet 1    Misc. Devices ([address]) MISC 1 each by Does not apply route daily 1 each 0     No current facility-administered medications for this visit. Assessment  This is a 67year old female with history of MI in 1997 with BMS placed. Recent diagnosed bilateral PE started on Eliquis switched to [facility], stopped after developing a hematoma along the abdominal wall. She will continue [facility] per heme/onc starting 3/2. Dr. Simi De La Paz plans to resume [facility]. She had a CTA pe    Plan  -Cont current medications  -Follow with Dr. Simi De La Paz to restart [facility] for bilateral PE.   -HTN is well managed with Imdur/Diazide  -TSH mildly elevated last visit managed by Dr. Simi De La Paz on Synthroid appropriately.   -Continue sotalol    Judge Shanice JANSEN PGY 1  ______________  Patient seen and examined with the resident. I agree with the history, exam, and plan with the following exceptions below. Labs, imaging, studies reviewed as well. ROS, PMH, PSH, SOC Hx, and Fam Hx reviewed. No complaints. Here for f/u.    RRR  Lungs clear  Abd soft  Speech dysarthric, in

## 2018-03-06 LAB
BASOPHILS ABSOLUTE: 39 /ΜL
BASOPHILS RELATIVE PERCENT: 0.7 %
EOSINOPHILS ABSOLUTE: 129 /ΜL
EOSINOPHILS RELATIVE PERCENT: 2.3 %
HCT VFR BLD CALC: 39 % (ref 36–46)
HEMOGLOBIN: 12.6 G/DL (ref 12–16)
LYMPHOCYTES ABSOLUTE: 1422 /ΜL
LYMPHOCYTES RELATIVE PERCENT: 25.4 %
MCH RBC QN AUTO: 31.5 PG
MCHC RBC AUTO-ENTMCNC: 32.3 G/DL
MCV RBC AUTO: 97.5 FL
MONOCYTES ABSOLUTE: 476 /ΜL
MONOCYTES RELATIVE PERCENT: 8.5 %
NEUTROPHILS ABSOLUTE: 3534 /ΜL
NEUTROPHILS RELATIVE PERCENT: 63.1 %
PDW BLD-RTO: 14.3 %
PLATELET # BLD: 303 K/ΜL
PMV BLD AUTO: 10.2 FL
RBC # BLD: 4 10^6/ΜL
WBC # BLD: 5.6 10^3/ML

## 2018-03-09 ENCOUNTER — TELEPHONE (OUTPATIENT)
Dept: INTERNAL MEDICINE CLINIC | Age: 73
End: 2018-03-09

## 2018-03-09 NOTE — TELEPHONE ENCOUNTER
She stated that she has sent a fax requesting clinical notes for pt's sit to stand lift. Please call her if we have not received the fax.

## 2018-03-21 ENCOUNTER — TELEPHONE (OUTPATIENT)
Dept: INTERNAL MEDICINE CLINIC | Age: 73
End: 2018-03-21

## 2018-03-21 RX ORDER — MAGNESIUM CARB/ALUMINUM HYDROX 105-160MG
TABLET,CHEWABLE ORAL
Qty: 472 ML | Refills: 0 | Status: SHIPPED | OUTPATIENT
Start: 2018-03-21 | End: 2019-08-20 | Stop reason: CLARIF

## 2018-03-26 ENCOUNTER — TELEPHONE (OUTPATIENT)
Dept: INTERNAL MEDICINE CLINIC | Age: 73
End: 2018-03-26

## 2018-04-03 ENCOUNTER — TELEPHONE (OUTPATIENT)
Dept: INTERNAL MEDICINE CLINIC | Age: 73
End: 2018-04-03

## 2018-04-10 ENCOUNTER — TELEPHONE (OUTPATIENT)
Dept: INTERNAL MEDICINE CLINIC | Age: 73
End: 2018-04-10

## 2018-04-13 RX ORDER — LISINOPRIL 40 MG/1
TABLET ORAL
Qty: 90 TABLET | Refills: 1 | Status: SHIPPED | OUTPATIENT
Start: 2018-04-13 | End: 2018-09-17 | Stop reason: SDUPTHER

## 2018-04-17 ENCOUNTER — TELEPHONE (OUTPATIENT)
Dept: INTERNAL MEDICINE CLINIC | Age: 73
End: 2018-04-17

## 2018-04-17 DIAGNOSIS — M79.601 PAIN OF RIGHT UPPER EXTREMITY: Primary | ICD-10-CM

## 2018-04-18 ENCOUNTER — TELEPHONE (OUTPATIENT)
Dept: INTERNAL MEDICINE CLINIC | Age: 73
End: 2018-04-18

## 2018-04-23 ENCOUNTER — HOSPITAL ENCOUNTER (OUTPATIENT)
Dept: MAMMOGRAPHY | Age: 73
Discharge: HOME | End: 2018-04-23
Attending: INTERNAL MEDICINE | Admitting: INTERNAL MEDICINE

## 2018-04-23 DIAGNOSIS — Z12.31 VISIT FOR SCREENING MAMMOGRAM: ICD-10-CM

## 2018-04-24 ENCOUNTER — TELEPHONE (OUTPATIENT)
Dept: INTERNAL MEDICINE CLINIC | Age: 73
End: 2018-04-24

## 2018-04-25 RX ORDER — TRIAMTERENE AND HYDROCHLOROTHIAZIDE 37.5; 25 MG/1; MG/1
CAPSULE ORAL
Qty: 90 CAPSULE | Refills: 1 | Status: SHIPPED | OUTPATIENT
Start: 2018-04-25 | End: 2018-09-17 | Stop reason: SDUPTHER

## 2018-04-27 RX ORDER — PANTOPRAZOLE SODIUM 40 MG/1
40 TABLET, DELAYED RELEASE ORAL
Qty: 30 TABLET | Refills: 5 | Status: SHIPPED | OUTPATIENT
Start: 2018-04-27 | End: 2018-07-05 | Stop reason: SDUPTHER

## 2018-05-01 ENCOUNTER — TELEPHONE (OUTPATIENT)
Dept: INTERNAL MEDICINE CLINIC | Age: 73
End: 2018-05-01

## 2018-06-15 ENCOUNTER — OFFICE VISIT (OUTPATIENT)
Dept: INTERNAL MEDICINE CLINIC | Age: 73
End: 2018-06-15

## 2018-06-15 VITALS — DIASTOLIC BLOOD PRESSURE: 70 MMHG | SYSTOLIC BLOOD PRESSURE: 110 MMHG | RESPIRATION RATE: 16 BRPM | HEART RATE: 72 BPM

## 2018-06-15 DIAGNOSIS — I10 ESSENTIAL HYPERTENSION: Primary | ICD-10-CM

## 2018-06-15 DIAGNOSIS — E07.9 THYROID DISEASE: ICD-10-CM

## 2018-06-15 DIAGNOSIS — I10 ESSENTIAL HYPERTENSION: ICD-10-CM

## 2018-06-15 DIAGNOSIS — Z91.81 AT HIGH RISK FOR FALLS: ICD-10-CM

## 2018-06-15 DIAGNOSIS — S81.802A WOUND OF LEFT LOWER EXTREMITY, INITIAL ENCOUNTER: ICD-10-CM

## 2018-06-15 DIAGNOSIS — M17.12 PRIMARY OSTEOARTHRITIS OF LEFT KNEE: ICD-10-CM

## 2018-06-15 LAB
ALBUMIN SERPL-MCNC: 4.2 G/DL (ref 3.4–5)
ANION GAP SERPL CALCULATED.3IONS-SCNC: 15 MMOL/L (ref 3–16)
BUN BLDV-MCNC: 14 MG/DL (ref 7–20)
CALCIUM SERPL-MCNC: 9.5 MG/DL (ref 8.3–10.6)
CHLORIDE BLD-SCNC: 98 MMOL/L (ref 99–110)
CO2: 27 MMOL/L (ref 21–32)
CREAT SERPL-MCNC: 0.6 MG/DL (ref 0.6–1.2)
GFR AFRICAN AMERICAN: >60
GFR NON-AFRICAN AMERICAN: >60
GLUCOSE BLD-MCNC: 129 MG/DL (ref 70–99)
HCT VFR BLD CALC: 43.5 % (ref 36–48)
HEMOGLOBIN: 14.7 G/DL (ref 12–16)
MCH RBC QN AUTO: 31.3 PG (ref 26–34)
MCHC RBC AUTO-ENTMCNC: 33.9 G/DL (ref 31–36)
MCV RBC AUTO: 92.3 FL (ref 80–100)
PDW BLD-RTO: 14.9 % (ref 12.4–15.4)
PHOSPHORUS: 3.9 MG/DL (ref 2.5–4.9)
PLATELET # BLD: 223 K/UL (ref 135–450)
PMV BLD AUTO: 9.8 FL (ref 5–10.5)
POTASSIUM SERPL-SCNC: 4.6 MMOL/L (ref 3.5–5.1)
RBC # BLD: 4.71 M/UL (ref 4–5.2)
SODIUM BLD-SCNC: 140 MMOL/L (ref 136–145)
TSH SERPL DL<=0.05 MIU/L-ACNC: 2.26 UIU/ML (ref 0.27–4.2)
WBC # BLD: 6.1 K/UL (ref 4–11)

## 2018-06-15 PROCEDURE — 4040F PNEUMOC VAC/ADMIN/RCVD: CPT | Performed by: INTERNAL MEDICINE

## 2018-06-15 PROCEDURE — G8400 PT W/DXA NO RESULTS DOC: HCPCS | Performed by: INTERNAL MEDICINE

## 2018-06-15 PROCEDURE — G8427 DOCREV CUR MEDS BY ELIG CLIN: HCPCS | Performed by: INTERNAL MEDICINE

## 2018-06-15 PROCEDURE — 1123F ACP DISCUSS/DSCN MKR DOCD: CPT | Performed by: INTERNAL MEDICINE

## 2018-06-15 PROCEDURE — 3017F COLORECTAL CA SCREEN DOC REV: CPT | Performed by: INTERNAL MEDICINE

## 2018-06-15 PROCEDURE — 1090F PRES/ABSN URINE INCON ASSESS: CPT | Performed by: INTERNAL MEDICINE

## 2018-06-15 PROCEDURE — G8417 CALC BMI ABV UP PARAM F/U: HCPCS | Performed by: INTERNAL MEDICINE

## 2018-06-15 PROCEDURE — 1036F TOBACCO NON-USER: CPT | Performed by: INTERNAL MEDICINE

## 2018-06-15 PROCEDURE — 99214 OFFICE O/P EST MOD 30 MIN: CPT | Performed by: INTERNAL MEDICINE

## 2018-06-15 PROCEDURE — G8598 ASA/ANTIPLAT THER USED: HCPCS | Performed by: INTERNAL MEDICINE

## 2018-06-16 ASSESSMENT — ENCOUNTER SYMPTOMS
SHORTNESS OF BREATH: 0
DIARRHEA: 0
VOMITING: 0
COUGH: 0
TROUBLE SWALLOWING: 1
WHEEZING: 1

## 2018-07-05 ENCOUNTER — TELEPHONE (OUTPATIENT)
Dept: INTERNAL MEDICINE CLINIC | Age: 73
End: 2018-07-05

## 2018-07-05 RX ORDER — PANTOPRAZOLE SODIUM 40 MG/1
40 TABLET, DELAYED RELEASE ORAL
Qty: 30 TABLET | Refills: 5 | Status: SHIPPED | OUTPATIENT
Start: 2018-07-05 | End: 2018-09-17 | Stop reason: SDUPTHER

## 2018-07-05 NOTE — TELEPHONE ENCOUNTER
Patient's caretaker called to say patient needs re-fill on Pantoprazole 40 mg. Please send in prescription by same method used in past.  Any questions give caretaker a call at number provided. Thanks.

## 2018-07-18 RX ORDER — LEVOTHYROXINE SODIUM 112 UG/1
TABLET ORAL
Qty: 30 TABLET | Refills: 0 | Status: SHIPPED | OUTPATIENT
Start: 2018-07-18 | End: 2018-09-17 | Stop reason: SDUPTHER

## 2018-07-18 RX ORDER — DULOXETIN HYDROCHLORIDE 60 MG/1
CAPSULE, DELAYED RELEASE ORAL
Qty: 30 CAPSULE | Refills: 0 | Status: SHIPPED | OUTPATIENT
Start: 2018-07-18 | End: 2018-09-17 | Stop reason: SDUPTHER

## 2018-08-27 RX ORDER — ISOSORBIDE MONONITRATE 30 MG/1
30 TABLET, EXTENDED RELEASE ORAL DAILY
Qty: 90 TABLET | Refills: 0 | Status: SHIPPED | OUTPATIENT
Start: 2018-08-27 | End: 2018-09-17 | Stop reason: SDUPTHER

## 2018-09-05 ENCOUNTER — TELEPHONE (OUTPATIENT)
Dept: INTERNAL MEDICINE CLINIC | Age: 73
End: 2018-09-05

## 2018-09-05 NOTE — TELEPHONE ENCOUNTER
Lise Roblero stated that pt is requesting a refill for Sotalol #90  Pt has a appt with Dr Cesar Bell on 9-17 but she is out of meds.   Please call meds into [address] [place] [address] , [address] [facility] [phone] - F [phone]

## 2018-09-06 RX ORDER — SOTALOL HYDROCHLORIDE 80 MG/1
TABLET ORAL
Qty: 90 TABLET | Refills: 0 | OUTPATIENT
Start: 2018-09-06

## 2018-09-17 ENCOUNTER — OFFICE VISIT (OUTPATIENT)
Dept: INTERNAL MEDICINE | Age: 73
End: 2018-09-17

## 2018-09-17 VITALS
HEART RATE: 74 BPM | SYSTOLIC BLOOD PRESSURE: 132 MMHG | BODY MASS INDEX: 44.11 KG/M2 | OXYGEN SATURATION: 94 % | WEIGHT: 249 LBS | DIASTOLIC BLOOD PRESSURE: 78 MMHG

## 2018-09-17 DIAGNOSIS — Z23 NEED FOR INFLUENZA VACCINATION: ICD-10-CM

## 2018-09-17 DIAGNOSIS — E66.01 MORBID OBESITY WITH BMI OF 40.0-44.9, ADULT (HCC): ICD-10-CM

## 2018-09-17 DIAGNOSIS — I25.10 CORONARY ARTERY DISEASE INVOLVING NATIVE CORONARY ARTERY OF NATIVE HEART WITHOUT ANGINA PECTORIS: ICD-10-CM

## 2018-09-17 DIAGNOSIS — I10 HYPERTENSION, UNSPECIFIED TYPE: ICD-10-CM

## 2018-09-17 DIAGNOSIS — I26.09 OTHER ACUTE PULMONARY EMBOLISM WITH ACUTE COR PULMONALE (HCC): ICD-10-CM

## 2018-09-17 DIAGNOSIS — Z12.11 SCREENING FOR COLORECTAL CANCER: ICD-10-CM

## 2018-09-17 DIAGNOSIS — I82.812 THROMBOSIS OF LEFT SAPHENOUS VEIN: ICD-10-CM

## 2018-09-17 DIAGNOSIS — E78.2 MIXED HYPERLIPIDEMIA: ICD-10-CM

## 2018-09-17 DIAGNOSIS — R30.0 DYSURIA: Primary | ICD-10-CM

## 2018-09-17 DIAGNOSIS — Z12.12 SCREENING FOR COLORECTAL CANCER: ICD-10-CM

## 2018-09-17 DIAGNOSIS — G31.85 CORTICOBASAL DEGENERATION (HCC): ICD-10-CM

## 2018-09-17 PROCEDURE — G8427 DOCREV CUR MEDS BY ELIG CLIN: HCPCS | Performed by: INTERNAL MEDICINE

## 2018-09-17 PROCEDURE — G8417 CALC BMI ABV UP PARAM F/U: HCPCS | Performed by: INTERNAL MEDICINE

## 2018-09-17 PROCEDURE — 1101F PT FALLS ASSESS-DOCD LE1/YR: CPT | Performed by: INTERNAL MEDICINE

## 2018-09-17 PROCEDURE — G8598 ASA/ANTIPLAT THER USED: HCPCS | Performed by: INTERNAL MEDICINE

## 2018-09-17 PROCEDURE — 1090F PRES/ABSN URINE INCON ASSESS: CPT | Performed by: INTERNAL MEDICINE

## 2018-09-17 PROCEDURE — 1036F TOBACCO NON-USER: CPT | Performed by: INTERNAL MEDICINE

## 2018-09-17 PROCEDURE — 3017F COLORECTAL CA SCREEN DOC REV: CPT | Performed by: INTERNAL MEDICINE

## 2018-09-17 PROCEDURE — G0008 ADMIN INFLUENZA VIRUS VAC: HCPCS | Performed by: INTERNAL MEDICINE

## 2018-09-17 PROCEDURE — 90662 IIV NO PRSV INCREASED AG IM: CPT | Performed by: INTERNAL MEDICINE

## 2018-09-17 PROCEDURE — G8400 PT W/DXA NO RESULTS DOC: HCPCS | Performed by: INTERNAL MEDICINE

## 2018-09-17 PROCEDURE — 4040F PNEUMOC VAC/ADMIN/RCVD: CPT | Performed by: INTERNAL MEDICINE

## 2018-09-17 PROCEDURE — 99214 OFFICE O/P EST MOD 30 MIN: CPT | Performed by: INTERNAL MEDICINE

## 2018-09-17 PROCEDURE — 1123F ACP DISCUSS/DSCN MKR DOCD: CPT | Performed by: INTERNAL MEDICINE

## 2018-09-17 RX ORDER — LISINOPRIL 40 MG/1
TABLET ORAL
Qty: 90 TABLET | Refills: 3 | Status: SHIPPED | OUTPATIENT
Start: 2018-09-17 | End: 2019-08-20 | Stop reason: ALTCHOICE

## 2018-09-17 RX ORDER — ATORVASTATIN CALCIUM 40 MG/1
40 TABLET, FILM COATED ORAL DAILY
Qty: 90 TABLET | Refills: 3 | Status: SHIPPED | OUTPATIENT
Start: 2018-09-17 | End: 2019-09-26 | Stop reason: SDUPTHER

## 2018-09-17 RX ORDER — LIDOCAINE 50 MG/G
1 PATCH TOPICAL DAILY
Qty: 90 PATCH | Refills: 3 | Status: SHIPPED | OUTPATIENT
Start: 2018-09-17 | End: 2019-08-20 | Stop reason: CLARIF

## 2018-09-17 RX ORDER — TRIAMTERENE AND HYDROCHLOROTHIAZIDE 37.5; 25 MG/1; MG/1
CAPSULE ORAL
Qty: 90 CAPSULE | Refills: 3 | Status: SHIPPED | OUTPATIENT
Start: 2018-09-17 | End: 2019-09-26 | Stop reason: SDUPTHER

## 2018-09-17 RX ORDER — LEVOTHYROXINE SODIUM 112 UG/1
TABLET ORAL
Qty: 90 TABLET | Refills: 3 | Status: ON HOLD | OUTPATIENT
Start: 2018-09-17 | End: 2019-08-23 | Stop reason: HOSPADM

## 2018-09-17 RX ORDER — SOTALOL HYDROCHLORIDE 80 MG/1
80 TABLET ORAL DAILY
Qty: 90 TABLET | Refills: 3 | Status: SHIPPED | OUTPATIENT
Start: 2018-09-17 | End: 2019-08-21 | Stop reason: SDUPTHER

## 2018-09-17 RX ORDER — DULOXETIN HYDROCHLORIDE 60 MG/1
CAPSULE, DELAYED RELEASE ORAL
Qty: 90 CAPSULE | Refills: 1 | Status: SHIPPED | OUTPATIENT
Start: 2018-09-17 | End: 2019-03-21 | Stop reason: SDUPTHER

## 2018-09-17 RX ORDER — PANTOPRAZOLE SODIUM 40 MG/1
40 TABLET, DELAYED RELEASE ORAL
Qty: 90 TABLET | Refills: 3 | Status: ON HOLD | OUTPATIENT
Start: 2018-09-17 | End: 2019-08-23 | Stop reason: HOSPADM

## 2018-09-17 RX ORDER — ISOSORBIDE MONONITRATE 30 MG/1
30 TABLET, EXTENDED RELEASE ORAL DAILY
Qty: 90 TABLET | Refills: 3 | Status: SHIPPED | OUTPATIENT
Start: 2018-09-17

## 2018-09-17 ASSESSMENT — PATIENT HEALTH QUESTIONNAIRE - PHQ9
SUM OF ALL RESPONSES TO PHQ9 QUESTIONS 1 & 2: 0
SUM OF ALL RESPONSES TO PHQ QUESTIONS 1-9: 0
SUM OF ALL RESPONSES TO PHQ QUESTIONS 1-9: 0
2. FEELING DOWN, DEPRESSED OR HOPELESS: 0
1. LITTLE INTEREST OR PLEASURE IN DOING THINGS: 0

## 2018-09-17 ASSESSMENT — ENCOUNTER SYMPTOMS
NAUSEA: 0
VOMITING: 0
CHEST TIGHTNESS: 0
SHORTNESS OF BREATH: 0
ABDOMINAL PAIN: 0

## 2018-09-17 NOTE — PROGRESS NOTES
Outpatient Note for established Patient - regular Visit    History Obtained From:  patient, electronic medical record  Is the patient new to me ? - Yes    HISTORY OF PRESENT ILLNESS:   The patient is a 68 y.o. female who is here today for :    1)  S/p PE 12/2017  She is on Xarelto    2)  CAD s/p MI 1997  On ACE, statin (Lipitor 40 mg)    3)  Morbid obesity    4) depression    5) HTN  BP today 132/78  On Maxzide, Lisinopril,     6) Hypothyroidism  On synthroid    7) Progressive Neurodegenerative disease  corticobasal degenration   She see Dr Glendy Wilson - I do not have more information about it currently   8) AF    9) Dysuria   she has burning with urination for the past few weeks   No abdominal pain/nausea/ vomiting. No fever/ chills. No flank pain. Past Medical History:        Diagnosis Date    Acid reflux     CAD (coronary artery disease)     Corticobasal degeneration     Depression     Hyperlipidemia     Hypertension     MI, old     Thyroid disease        Social History:   TOBACCO:   reports that she quit smoking about 21 years ago. Her smoking use included Cigarettes. She started smoking about 59 years ago. She has a 45.00 pack-year smoking history. She has never used smokeless tobacco.    ETOH:   reports that she does not drink alcohol. Current Medications:    Prior to Admission medications    Medication Sig Start Date End Date Taking?  Authorizing Provider   sotalol (BETAPACE) 80 MG tablet Take 1 tablet by mouth daily 9/17/18  Yes Danita Winkler MD   triamterene-hydrochlorothiazide (DYAZIDE) 37.5-25 MG per capsule TAKE 1 CAPSULE DAILY 9/17/18  Yes Danita Winkler MD   rivaroxaban (XARELTO) 15 MG TABS tablet Take 1 tablet by mouth Daily with supper 9/17/18  Yes Danita Winkler MD   pantoprazole (PROTONIX) 40 MG tablet Take 1 tablet by mouth every morning (before breakfast) 9/17/18  Yes Danita Winkler MD   nystatin-triamcinolone (MYCOLOG II) 289093-6.1 UNIT/GM-% cream Apply 1/2 inch topically to each ear canal 2 times daily.  9/17/18  Yes Phyllis Vallejo MD   lisinopril (PRINIVIL;ZESTRIL) 40 MG tablet TAKE 1 TABLET DAILY 9/17/18  Yes Phyllis Vallejo MD   lidocaine (LIDODERM) 5 % Place 1 patch onto the skin daily 12 hours on, 12 hours off. 9/17/18  Yes Phyllis Vallejo MD   levothyroxine (SYNTHROID) 112 MCG tablet TAKE 1 TABLET DAILY 9/17/18  Yes Phyllis Vallejo MD   isosorbide mononitrate (IMDUR) 30 MG extended release tablet Take 1 tablet by mouth daily 9/17/18  Yes Phyllis Vallejo MD   DULoxetine (CYMBALTA) 60 MG extended release capsule TAKE 1 CAPSULE DAILY 9/17/18  Yes Phyllis Vallejo MD   atorvastatin (LIPITOR) 40 MG tablet Take 1 tablet by mouth daily 9/17/18  Yes Phyllis Vallejo MD   Psyllium (METAMUCIL) 28.3 % POWD Take by mouth 2 times daily   Yes Historical Provider, MD   zoster recombinant adjuvanted vaccine (SHINGRIX) 50 MCG SUSR injection Inject 0.5 ml intramuscularly now and repeat dose in 2 months 6/15/18  Yes Cristy Rosen MD   mineral oil liquid Take 30 mls by mouth 2 times daily until bowels move, for up to 7 days 3/21/18  Yes Cristy Rosen MD   Spacer/Aero-Holding Chambers (E-Z SPACER) ELLIOTT 1 Device by Does not apply route daily as needed (use with MDI) 2/26/18  Yes Cristy Rosen MD   albuterol sulfate HFA (VENTOLIN HFA) 108 (90 Base) MCG/ACT inhaler Inhale 2 puffs into the lungs every 6 hours as needed for Wheezing or Shortness of Breath 2/26/18  Yes Cristy Rosen MD   acetaminophen (TYLENOL) 500 MG tablet Take 2 tablets by mouth every 6 hours as needed for Pain 2/12/18  Yes Cristy Rosen MD   Cholecalciferol (VITAMIN D3) 2000 units TABS Take 1 tablet by mouth daily 2/12/18  Yes Cristy Rosen MD   Calcium Carb-Cholecalciferol (CALCIUM + D3 PO) Take 1 each by mouth daily   Yes Historical Provider, MD   Omega-3 Fatty Acids (FISH OIL) 600 MG CAPS Take 1 each by mouth daily   Yes Historical distress. She has no wheezes. She has no rales. Abdominal: Soft. She exhibits no distension. There is no tenderness. Musculoskeletal: She exhibits no deformity. Lymphadenopathy:        Head (right side): No submental and no submandibular adenopathy present. Head (left side): No submental and no submandibular adenopathy present. She has no cervical adenopathy. Right cervical: No superficial cervical and no deep cervical adenopathy present. Left cervical: No superficial cervical and no deep cervical adenopathy present. Neurological: She is alert and oriented to person, place, and time. She has normal reflexes. A cranial nerve deficit is present. She exhibits normal muscle tone. GCS eye subscore is 4. GCS verbal subscore is 5. GCS motor subscore is 6. CN:  - difficulties with up gaze   - difficulties with tongue/ neck movements. - R hand-spatic weakness. No sensory deficit appreciated. Skin: No rash noted. She is not diaphoretic. Psychiatric: She has a normal mood and affect. Her behavior is normal. Thought content normal.       Assessment/Plan:   Mansi Mahmood was seen today for new patient. Diagnoses and all orders for this visit:    Dysuria  For a few weeks  Since no other Sx--> UA and culture first before treatment. -     URINALYSIS WITH MICROSCOPIC    Corticobasal degeneration  Condition is stable   She is followed by a neurologist    Coronary artery disease involving native coronary artery of native heart without angina pectoris  Asymptomatic  She is on ACE, statin, AC  Followed by Dr Jessica Tee  -     lisinopril (PRINIVIL;ZESTRIL) 40 MG tablet; TAKE 1 TABLET DAILY  -     isosorbide mononitrate (IMDUR) 30 MG extended release tablet; Take 1 tablet by mouth daily  -     atorvastatin (LIPITOR) 40 MG tablet; Take 1 tablet by mouth daily    Hypertension, unspecified type  Well controlled- no changes in medication today.    -     triamterene-hydrochlorothiazide (DYAZIDE) 37.5-25 MG per capsule; TAKE 1 CAPSULE DAILY  -     lisinopril (PRINIVIL;ZESTRIL) 40 MG tablet; TAKE 1 TABLET DAILY  -     isosorbide mononitrate (IMDUR) 30 MG extended release tablet; Take 1 tablet by mouth daily    Morbid obesity with BMI of 40.0-44.9, adult (HCC)    Thrombosis of left saphenous vein    Mixed hyperlipidemia  Well controlled- no changes in medication today. -     atorvastatin (LIPITOR) 40 MG tablet; Take 1 tablet by mouth daily    Other acute pulmonary embolism with acute cor pulmonale (HCC)  -     rivaroxaban (XARELTO) 15 MG TABS tablet; Take 1 tablet by mouth Daily with supper    Need for influenza vaccination  -     INFLUENZA, HIGH DOSE, 65 YRS +, IM, PF, PREFILL SYR, 0.5ML (FLUZONE HD)    Other orders  -     sotalol (BETAPACE) 80 MG tablet; Take 1 tablet by mouth daily  -     pantoprazole (PROTONIX) 40 MG tablet; Take 1 tablet by mouth every morning (before breakfast)  -     nystatin-triamcinolone (MYCOLOG II) 401057-9.1 UNIT/GM-% cream; Apply 1/2 inch topically to each ear canal 2 times daily. -     lidocaine (LIDODERM) 5 %; Place 1 patch onto the skin daily 12 hours on, 12 hours off.  -     levothyroxine (SYNTHROID) 112 MCG tablet; TAKE 1 TABLET DAILY  -     DULoxetine (CYMBALTA) 60 MG extended release capsule; TAKE 1 CAPSULE DAILY        - Patient was encouraged to call the office (and ask to see me) or be seen by other provider for any worsening or lack    of improvement in his symptoms.       - Pt was asked to schedule an appointment in 6 months, and to let me know of any scheduling difficulties.      Additional patients instructions (if given):   Patient Instructions   Please let me know if urinary symptoms are not resolving with the antibiotics       Nehal Puri M.D.   9/17/2018, 3:09 PM

## 2018-09-20 ENCOUNTER — TELEPHONE (OUTPATIENT)
Dept: INTERNAL MEDICINE | Age: 73
End: 2018-09-20

## 2018-09-21 LAB — URINE CULTURE, ROUTINE: NORMAL

## 2018-11-09 ENCOUNTER — OFFICE VISIT (OUTPATIENT)
Dept: CARDIOLOGY CLINIC | Age: 73
End: 2018-11-09

## 2018-11-09 VITALS
DIASTOLIC BLOOD PRESSURE: 62 MMHG | HEART RATE: 60 BPM | BODY MASS INDEX: 44.11 KG/M2 | SYSTOLIC BLOOD PRESSURE: 128 MMHG | WEIGHT: 249 LBS

## 2018-11-09 DIAGNOSIS — E78.5 DYSLIPIDEMIA: ICD-10-CM

## 2018-11-09 DIAGNOSIS — I10 ESSENTIAL HYPERTENSION: ICD-10-CM

## 2018-11-09 DIAGNOSIS — I25.10 CORONARY ARTERY DISEASE INVOLVING NATIVE CORONARY ARTERY OF NATIVE HEART WITHOUT ANGINA PECTORIS: Primary | ICD-10-CM

## 2018-11-09 PROCEDURE — 99214 OFFICE O/P EST MOD 30 MIN: CPT | Performed by: NURSE PRACTITIONER

## 2018-11-09 NOTE — PROGRESS NOTES
CC/HPI:  68 y.o. with CAD (BMS 1997), HTN, HLD and hx bilateral PEs who is here for follow up. She denies cp, sob, orthopnea, weight gain or change in edema. No LH/dizziness, palpitations or syncope. No fevers, cough or GI/ bleeding. She occasional gets LE edema. Past Medical History:   Diagnosis Date    Acid reflux     CAD (coronary artery disease)     Corticobasal degeneration     Depression     Hyperlipidemia     Hypertension     MI, old     Thyroid disease      Past Surgical History:   Procedure Laterality Date    ABDOMEN SURGERY      CORONARY ANGIOPLASTY WITH STENT PLACEMENT      1997 for MI, Palmaz-Agnieszka, RCA    JOINT REPLACEMENT Right 01/2009    TKR    KNEE SURGERY Right 2012    TKR revision    TONSILLECTOMY       No family history on file. Social History   Substance Use Topics    Smoking status: Former Smoker     Packs/day: 1.00     Years: 45.00     Types: Cigarettes     Start date: 1959     Quit date: 1997    Smokeless tobacco: Never Used    Alcohol use No     Allergies:Aricept [donepezil hcl]; Augmentin [amoxicillin-pot clavulanate]; Ciprofloxacin; Dynacirc [isradipine]; Spironolactone; Biotin; and Mobic [meloxicam]    Review of Systems  General: No changes in weight, fatigue, or night sweats. HEENT: No blurry or decreased vision. No changes in hearing, nasal discharge or sore throat. Cardiovascular:  See HPI. Respiratory: No cough, hemoptysis, or wheezing. No history of asthma. Gastrointestinal:  No abdominal pain, hematochezia, melana, constipation, diarrhea. Hx rectal hematoma and GERD  Genito-Urinary: No dysuria or hematuria. No urgency or polyuria. Musculoskeletal:  No complaints of joint pain, joint swelling or muscular weakness/soreness. Neurological:  No dizziness, headaches, numbness/tingling, speech problems or weakness. No history of a stroke or TIA. Psychological:  + depression.   Hematological and Lymphatic: No abnormal bleeding or bruising, blood clots,

## 2018-11-19 ENCOUNTER — TELEPHONE (OUTPATIENT)
Dept: INTERNAL MEDICINE CLINIC | Age: 73
End: 2018-11-19

## 2018-11-26 ENCOUNTER — TELEPHONE (OUTPATIENT)
Dept: INTERNAL MEDICINE CLINIC | Age: 73
End: 2018-11-26

## 2018-11-30 ENCOUNTER — TELEPHONE (OUTPATIENT)
Dept: INTERNAL MEDICINE CLINIC | Age: 73
End: 2018-11-30

## 2018-11-30 NOTE — TELEPHONE ENCOUNTER
Patient seen for OT, PT with [facility]. Patient has been vomiting for a day and 1/2 and daughter would like her mother to be evaluated by nurses. Please give verbal ok and place orders.

## 2019-01-02 ENCOUNTER — TELEPHONE (OUTPATIENT)
Dept: INTERNAL MEDICINE CLINIC | Age: 74
End: 2019-01-02

## 2019-01-02 DIAGNOSIS — N39.0 URINARY TRACT INFECTION WITHOUT HEMATURIA, SITE UNSPECIFIED: Primary | ICD-10-CM

## 2019-01-07 ENCOUNTER — TELEPHONE (OUTPATIENT)
Dept: INTERNAL MEDICINE CLINIC | Age: 74
End: 2019-01-07

## 2019-01-11 RX ORDER — NITROFURANTOIN 25; 75 MG/1; MG/1
100 CAPSULE ORAL 2 TIMES DAILY
Qty: 10 CAPSULE | Refills: 0 | Status: SHIPPED | OUTPATIENT
Start: 2019-01-11 | End: 2019-01-16

## 2019-02-18 ENCOUNTER — TELEPHONE (OUTPATIENT)
Dept: INTERNAL MEDICINE CLINIC | Age: 74
End: 2019-02-18

## 2019-03-19 ENCOUNTER — TELEPHONE (OUTPATIENT)
Dept: INTERNAL MEDICINE CLINIC | Age: 74
End: 2019-03-19

## 2019-03-20 ENCOUNTER — OFFICE VISIT (OUTPATIENT)
Dept: SURGERY | Age: 74
End: 2019-03-20
Payer: MEDICARE

## 2019-03-20 ENCOUNTER — OFFICE VISIT (OUTPATIENT)
Dept: INTERNAL MEDICINE CLINIC | Age: 74
End: 2019-03-20
Payer: MEDICARE

## 2019-03-20 VITALS
BODY MASS INDEX: 42.68 KG/M2 | HEIGHT: 64 IN | WEIGHT: 250 LBS | DIASTOLIC BLOOD PRESSURE: 64 MMHG | RESPIRATION RATE: 16 BRPM | SYSTOLIC BLOOD PRESSURE: 128 MMHG

## 2019-03-20 VITALS
DIASTOLIC BLOOD PRESSURE: 58 MMHG | OXYGEN SATURATION: 97 % | HEART RATE: 71 BPM | SYSTOLIC BLOOD PRESSURE: 104 MMHG | WEIGHT: 249 LBS | BODY MASS INDEX: 44.12 KG/M2 | HEIGHT: 63 IN

## 2019-03-20 DIAGNOSIS — H61.23 BILATERAL IMPACTED CERUMEN: ICD-10-CM

## 2019-03-20 DIAGNOSIS — I25.10 CORONARY ARTERY DISEASE INVOLVING NATIVE CORONARY ARTERY OF NATIVE HEART WITHOUT ANGINA PECTORIS: ICD-10-CM

## 2019-03-20 DIAGNOSIS — L02.213 ABSCESS OF CHEST WALL: Primary | ICD-10-CM

## 2019-03-20 DIAGNOSIS — L08.9 INFECTED SEBACEOUS CYST OF SKIN: Primary | ICD-10-CM

## 2019-03-20 DIAGNOSIS — E07.9 THYROID DISEASE: ICD-10-CM

## 2019-03-20 DIAGNOSIS — I26.09 OTHER ACUTE PULMONARY EMBOLISM WITH ACUTE COR PULMONALE (HCC): ICD-10-CM

## 2019-03-20 DIAGNOSIS — E78.2 MIXED HYPERLIPIDEMIA: ICD-10-CM

## 2019-03-20 DIAGNOSIS — L72.3 INFECTED SEBACEOUS CYST OF SKIN: Primary | ICD-10-CM

## 2019-03-20 PROCEDURE — 1123F ACP DISCUSS/DSCN MKR DOCD: CPT | Performed by: INTERNAL MEDICINE

## 2019-03-20 PROCEDURE — G8417 CALC BMI ABV UP PARAM F/U: HCPCS | Performed by: INTERNAL MEDICINE

## 2019-03-20 PROCEDURE — G8598 ASA/ANTIPLAT THER USED: HCPCS | Performed by: INTERNAL MEDICINE

## 2019-03-20 PROCEDURE — 1090F PRES/ABSN URINE INCON ASSESS: CPT | Performed by: INTERNAL MEDICINE

## 2019-03-20 PROCEDURE — G8482 FLU IMMUNIZE ORDER/ADMIN: HCPCS | Performed by: INTERNAL MEDICINE

## 2019-03-20 PROCEDURE — 4040F PNEUMOC VAC/ADMIN/RCVD: CPT | Performed by: INTERNAL MEDICINE

## 2019-03-20 PROCEDURE — G8427 DOCREV CUR MEDS BY ELIG CLIN: HCPCS | Performed by: INTERNAL MEDICINE

## 2019-03-20 PROCEDURE — 1101F PT FALLS ASSESS-DOCD LE1/YR: CPT | Performed by: INTERNAL MEDICINE

## 2019-03-20 PROCEDURE — 99213 OFFICE O/P EST LOW 20 MIN: CPT | Performed by: INTERNAL MEDICINE

## 2019-03-20 PROCEDURE — 1036F TOBACCO NON-USER: CPT | Performed by: INTERNAL MEDICINE

## 2019-03-20 PROCEDURE — 10061 I&D ABSCESS COMP/MULTIPLE: CPT | Performed by: SURGERY

## 2019-03-20 PROCEDURE — 3017F COLORECTAL CA SCREEN DOC REV: CPT | Performed by: INTERNAL MEDICINE

## 2019-03-20 PROCEDURE — G8400 PT W/DXA NO RESULTS DOC: HCPCS | Performed by: INTERNAL MEDICINE

## 2019-03-20 RX ORDER — CEPHALEXIN 500 MG/1
500 CAPSULE ORAL 4 TIMES DAILY
Qty: 20 CAPSULE | Refills: 0 | Status: SHIPPED | OUTPATIENT
Start: 2019-03-20 | End: 2019-03-25

## 2019-03-20 RX ORDER — SULFAMETHOXAZOLE AND TRIMETHOPRIM 800; 160 MG/1; MG/1
1 TABLET ORAL 2 TIMES DAILY
Qty: 10 TABLET | Refills: 0 | Status: SHIPPED | OUTPATIENT
Start: 2019-03-20 | End: 2019-03-25

## 2019-03-20 ASSESSMENT — ENCOUNTER SYMPTOMS
CHEST TIGHTNESS: 0
VOMITING: 0
ABDOMINAL PAIN: 0
SHORTNESS OF BREATH: 0
NAUSEA: 0

## 2019-03-22 DIAGNOSIS — I26.09 OTHER ACUTE PULMONARY EMBOLISM WITH ACUTE COR PULMONALE (HCC): ICD-10-CM

## 2019-03-22 LAB
ANAEROBIC CULTURE: NORMAL
WOUND/ABSCESS: NORMAL

## 2019-03-24 LAB
ANAEROBIC CULTURE: ABNORMAL
GRAM STAIN RESULT: ABNORMAL
ORGANISM: ABNORMAL
WOUND/ABSCESS: ABNORMAL
WOUND/ABSCESS: ABNORMAL

## 2019-03-24 RX ORDER — RIVAROXABAN 15 MG/1
TABLET, FILM COATED ORAL
Qty: 90 TABLET | Refills: 1 | Status: SHIPPED | OUTPATIENT
Start: 2019-03-24 | End: 2019-09-20 | Stop reason: SDUPTHER

## 2019-03-27 ENCOUNTER — OFFICE VISIT (OUTPATIENT)
Dept: SURGERY | Age: 74
End: 2019-03-27

## 2019-03-27 VITALS — SYSTOLIC BLOOD PRESSURE: 104 MMHG | DIASTOLIC BLOOD PRESSURE: 72 MMHG

## 2019-03-27 DIAGNOSIS — E78.2 MIXED HYPERLIPIDEMIA: ICD-10-CM

## 2019-03-27 DIAGNOSIS — E07.9 THYROID DISEASE: ICD-10-CM

## 2019-03-27 DIAGNOSIS — L02.213 ABSCESS OF CHEST WALL: ICD-10-CM

## 2019-03-27 DIAGNOSIS — L72.3 INFECTED SEBACEOUS CYST OF SKIN: Primary | ICD-10-CM

## 2019-03-27 DIAGNOSIS — I25.10 CORONARY ARTERY DISEASE INVOLVING NATIVE CORONARY ARTERY OF NATIVE HEART WITHOUT ANGINA PECTORIS: ICD-10-CM

## 2019-03-27 DIAGNOSIS — L08.9 INFECTED SEBACEOUS CYST OF SKIN: Primary | ICD-10-CM

## 2019-03-27 LAB
A/G RATIO: 1.4 (ref 1.1–2.2)
ALBUMIN SERPL-MCNC: 4.1 G/DL (ref 3.4–5)
ALP BLD-CCNC: 53 U/L (ref 40–129)
ALT SERPL-CCNC: 16 U/L (ref 10–40)
ANION GAP SERPL CALCULATED.3IONS-SCNC: 9 MMOL/L (ref 3–16)
AST SERPL-CCNC: 16 U/L (ref 15–37)
BASOPHILS ABSOLUTE: 0 K/UL (ref 0–0.2)
BASOPHILS RELATIVE PERCENT: 0.7 %
BILIRUB SERPL-MCNC: <0.2 MG/DL (ref 0–1)
BUN BLDV-MCNC: 15 MG/DL (ref 7–20)
CALCIUM SERPL-MCNC: 9.5 MG/DL (ref 8.3–10.6)
CHLORIDE BLD-SCNC: 102 MMOL/L (ref 99–110)
CHOLESTEROL, TOTAL: 165 MG/DL (ref 0–199)
CO2: 30 MMOL/L (ref 21–32)
CREAT SERPL-MCNC: 0.7 MG/DL (ref 0.6–1.2)
EOSINOPHILS ABSOLUTE: 0.1 K/UL (ref 0–0.6)
EOSINOPHILS RELATIVE PERCENT: 1.8 %
GFR AFRICAN AMERICAN: >60
GFR NON-AFRICAN AMERICAN: >60
GLOBULIN: 2.9 G/DL
GLUCOSE BLD-MCNC: 142 MG/DL (ref 70–99)
HCT VFR BLD CALC: 39.4 % (ref 36–48)
HDLC SERPL-MCNC: 42 MG/DL (ref 40–60)
HEMOGLOBIN: 13.3 G/DL (ref 12–16)
LDL CHOLESTEROL CALCULATED: ABNORMAL MG/DL
LDL CHOLESTEROL DIRECT: 79 MG/DL
LYMPHOCYTES ABSOLUTE: 1.9 K/UL (ref 1–5.1)
LYMPHOCYTES RELATIVE PERCENT: 28.8 %
MCH RBC QN AUTO: 32.6 PG (ref 26–34)
MCHC RBC AUTO-ENTMCNC: 33.8 G/DL (ref 31–36)
MCV RBC AUTO: 96.6 FL (ref 80–100)
MONOCYTES ABSOLUTE: 0.4 K/UL (ref 0–1.3)
MONOCYTES RELATIVE PERCENT: 6.3 %
NEUTROPHILS ABSOLUTE: 4.2 K/UL (ref 1.7–7.7)
NEUTROPHILS RELATIVE PERCENT: 62.4 %
PDW BLD-RTO: 12.4 % (ref 12.4–15.4)
PLATELET # BLD: 277 K/UL (ref 135–450)
PMV BLD AUTO: 8.8 FL (ref 5–10.5)
POTASSIUM SERPL-SCNC: 5.5 MMOL/L (ref 3.5–5.1)
RBC # BLD: 4.08 M/UL (ref 4–5.2)
SODIUM BLD-SCNC: 141 MMOL/L (ref 136–145)
TOTAL PROTEIN: 7 G/DL (ref 6.4–8.2)
TRIGL SERPL-MCNC: 326 MG/DL (ref 0–150)
TSH REFLEX: 1.76 UIU/ML (ref 0.27–4.2)
VLDLC SERPL CALC-MCNC: ABNORMAL MG/DL
WBC # BLD: 6.7 K/UL (ref 4–11)

## 2019-03-27 PROCEDURE — 99024 POSTOP FOLLOW-UP VISIT: CPT | Performed by: SURGERY

## 2019-03-28 ENCOUNTER — TELEPHONE (OUTPATIENT)
Dept: INTERNAL MEDICINE CLINIC | Age: 74
End: 2019-03-28

## 2019-03-28 DIAGNOSIS — E87.5 SERUM POTASSIUM ELEVATED: Primary | ICD-10-CM

## 2019-04-17 ENCOUNTER — TELEPHONE (OUTPATIENT)
Dept: INTERNAL MEDICINE CLINIC | Age: 74
End: 2019-04-17

## 2019-04-17 NOTE — TELEPHONE ENCOUNTER
Olman Hooker with [facility] called just with and FYI to let you know that patient has been discharged from home, physical and occupational therapy as of today, 04/17/19.

## 2019-04-19 ENCOUNTER — TELEPHONE (OUTPATIENT)
Dept: INTERNAL MEDICINE CLINIC | Age: 74
End: 2019-04-19

## 2019-06-19 ENCOUNTER — OFFICE VISIT (OUTPATIENT)
Dept: CARDIOLOGY CLINIC | Age: 74
End: 2019-06-19
Payer: MEDICARE

## 2019-06-19 VITALS — DIASTOLIC BLOOD PRESSURE: 80 MMHG | SYSTOLIC BLOOD PRESSURE: 120 MMHG | HEART RATE: 56 BPM

## 2019-06-19 DIAGNOSIS — I26.99 PULMONARY EMBOLISM WITHOUT ACUTE COR PULMONALE, UNSPECIFIED CHRONICITY, UNSPECIFIED PULMONARY EMBOLISM TYPE (HCC): ICD-10-CM

## 2019-06-19 DIAGNOSIS — I25.10 CORONARY ARTERY DISEASE INVOLVING NATIVE CORONARY ARTERY OF NATIVE HEART WITHOUT ANGINA PECTORIS: Primary | ICD-10-CM

## 2019-06-19 DIAGNOSIS — I10 ESSENTIAL HYPERTENSION: ICD-10-CM

## 2019-06-19 DIAGNOSIS — E78.5 DYSLIPIDEMIA: ICD-10-CM

## 2019-06-19 PROCEDURE — G8427 DOCREV CUR MEDS BY ELIG CLIN: HCPCS | Performed by: NURSE PRACTITIONER

## 2019-06-19 PROCEDURE — 3017F COLORECTAL CA SCREEN DOC REV: CPT | Performed by: NURSE PRACTITIONER

## 2019-06-19 PROCEDURE — 99214 OFFICE O/P EST MOD 30 MIN: CPT | Performed by: NURSE PRACTITIONER

## 2019-06-19 PROCEDURE — G8400 PT W/DXA NO RESULTS DOC: HCPCS | Performed by: NURSE PRACTITIONER

## 2019-06-19 PROCEDURE — G8598 ASA/ANTIPLAT THER USED: HCPCS | Performed by: NURSE PRACTITIONER

## 2019-06-19 PROCEDURE — 1123F ACP DISCUSS/DSCN MKR DOCD: CPT | Performed by: NURSE PRACTITIONER

## 2019-06-19 PROCEDURE — 4040F PNEUMOC VAC/ADMIN/RCVD: CPT | Performed by: NURSE PRACTITIONER

## 2019-06-19 PROCEDURE — G8417 CALC BMI ABV UP PARAM F/U: HCPCS | Performed by: NURSE PRACTITIONER

## 2019-06-19 PROCEDURE — 1036F TOBACCO NON-USER: CPT | Performed by: NURSE PRACTITIONER

## 2019-06-19 PROCEDURE — 1090F PRES/ABSN URINE INCON ASSESS: CPT | Performed by: NURSE PRACTITIONER

## 2019-06-19 NOTE — PROGRESS NOTES
CC/HPI:  76 y. o. with CAD (BMS ), HTN, HLD and hx bilateral PEs who is here for follow up. She denies cp, sob, orthopnea, LH/dizziness, palpitations or syncope. No change to mild LE edema. No fevers, n/v/d or GI/ bleeding. Tolerating medications. Past Medical History:   Diagnosis Date    Acid reflux     CAD (coronary artery disease)     BMS in     Corticobasal degeneration     Depression     Hyperlipidemia     Hypertension     MI, old     Pulmonary emboli ([address]     Thyroid disease      Past Surgical History:   Procedure Laterality Date    ABDOMEN SURGERY      CORONARY ANGIOPLASTY WITH STENT PLACEMENT       for MI, Palmaz-Agnieszka, RCA    JOINT REPLACEMENT Right 2009    TKR    KNEE SURGERY Right 2012    TKR revision    TONSILLECTOMY       No family history on file. Social History     Tobacco Use    Smoking status: Former Smoker     Packs/day: 1.00     Years: 45.00     Pack years: 45.00     Types: Cigarettes     Start date:      Last attempt to quit:      Years since quittin.4    Smokeless tobacco: Never Used   Substance Use Topics    Alcohol use: No    Drug use: No     Allergies:Aricept [donepezil hcl]; Augmentin [amoxicillin-pot clavulanate]; Biaxin [clarithromycin]; Ciprofloxacin; Dynacirc [isradipine]; Spironolactone; Biotin; Mobic [meloxicam]; and Sulfa antibiotics    Review of Systems  General: No changes in weight, fatigue, or night sweats. HEENT: No blurry or decreased vision. No changes in hearing, nasal discharge or sore throat. Cardiovascular:  See HPI. Respiratory: No cough, hemoptysis, or wheezing. No history of asthma. Gastrointestinal:  No abdominal pain, hematochezia, melana, constipation, diarrhea.  + Hx rectal hematoma and GERD  Genito-Urinary: No dysuria or hematuria. No urgency or polyuria. Musculoskeletal:  No complaints of joint pain, joint swelling or muscular weakness/soreness.   Neurological:  No dizziness, headaches, Date    [facility] see below 2019    [facility] 26 2018    [facility] 85 2017     Lab Results   Component Value Date    LABVLDL see below 2019    LABVLDL 39 2018    LABVLDL 39 2017     TSH:  Lab Results   Component Value Date    TSH 2.26 06/15/2018       IMAGIN/2018 ECG; Sinus bradycardia, non-specific tw abnl. No ischemic changes. 2018 BLE venous duplex:      Technically difficult study due to body habitus and depth of the vessels.    Isolated, acute on chronic, totally occluding superficial venous thrombosis    involving the right short saphenous vein in the mid calf area    There is no evidence of deep or superficial venous thrombosis involving the    bilateral lower extremities.         2017 Echo:   Technically difficult study with normal left ventricle size   Normal left ventricular wall thickness.   Left ventricular function is normal with ejection fraction estimated at 55-60%.  Regional wall motion abnormalities cannot be excluded.   Diastolic filling parameters suggests grade I diastolic dysfunction.   Trivial tricuspid regurgitation.   Similar to prior echo on (1/3/17). 2017 ECG: Sinus tach, non-specific tw abl    2017 Nuc stress:     Normal myocardial perfusion scan.        No findings of pharmacologic stress induced reversible ischemia. 2017 Carotid duplex:          Duplex sonography with color flow enhancement was performed bilaterally on    the cervical carotid system. No evidence of hemodynamically significant    stenosis in the bilateral internal carotid and vertebral arteries.  There is    >50% stenosis of the right external carotid artery based on duplex criteria       Medications:   Current Outpatient Medications   Medication Sig Dispense Refill    carbidopa-levodopa (SINEMET)  MG per tablet TAKE 2 TABLETS BY MOUTH THREE TIMES DAILY  5    XARELTO 15 MG TABS tablet TAKE 1 TABLET DAILY WITH SUPPER 90 tablet 1    DULoxetine (CYMBALTA) 60 MG extended release capsule TAKE 1 CAPSULE DAILY 90 capsule 1    sotalol (BETAPACE) 80 MG tablet Take 1 tablet by mouth daily 90 tablet 3    triamterene-hydrochlorothiazide (DYAZIDE) 37.5-25 MG per capsule TAKE 1 CAPSULE DAILY 90 capsule 3    pantoprazole (PROTONIX) 40 MG tablet Take 1 tablet by mouth every morning (before breakfast) 90 tablet 3    nystatin-triamcinolone (MYCOLOG II) 541716-9.1 UNIT/GM-% cream Apply 1/2 inch topically to each ear canal 2 times daily. 3 Tube 3    lisinopril (PRINIVIL;ZESTRIL) 40 MG tablet TAKE 1 TABLET DAILY 90 tablet 3    lidocaine (LIDODERM) 5 % Place 1 patch onto the skin daily 12 hours on, 12 hours off.  90 patch 3    levothyroxine (SYNTHROID) 112 MCG tablet TAKE 1 TABLET DAILY 90 tablet 3    isosorbide mononitrate (IMDUR) 30 MG extended release tablet Take 1 tablet by mouth daily 90 tablet 3    atorvastatin (LIPITOR) 40 MG tablet Take 1 tablet by mouth daily 90 tablet 3    Psyllium (METAMUCIL) 28.3 % POWD Take by mouth 2 times daily      zoster recombinant adjuvanted vaccine (SHINGRIX) 50 MCG SUSR injection Inject 0.5 ml intramuscularly now and repeat dose in 2 months 1 each 1    mineral oil liquid Take 30 mls by mouth 2 times daily until bowels move, for up to 7 days 472 mL 0    Spacer/Aero-Holding Chambers (E-Z SPACER) ELLIOTT 1 Device by Does not apply route daily as needed (use with MDI) 1 Device 0    albuterol sulfate HFA (VENTOLIN HFA) 108 (90 Base) MCG/ACT inhaler Inhale 2 puffs into the lungs every 6 hours as needed for Wheezing or Shortness of Breath 1 Inhaler 3    acetaminophen (TYLENOL) 500 MG tablet Take 2 tablets by mouth every 6 hours as needed for Pain 250 tablet 3    Cholecalciferol (VITAMIN D3) 2000 units TABS Take 1 tablet by mouth daily 90 tablet 3    Calcium Carb-Cholecalciferol (CALCIUM + D3 PO) Take 1 each by mouth daily      Omega-3 Fatty Acids (FISH OIL) 600 MG CAPS Take 1 each by mouth daily      FIBER ADULT GUMMIES PO Take 1

## 2019-07-15 ENCOUNTER — TELEPHONE (OUTPATIENT)
Dept: INTERNAL MEDICINE CLINIC | Age: 74
End: 2019-07-15

## 2019-07-17 DIAGNOSIS — R30.0 DYSURIA: Primary | ICD-10-CM

## 2019-07-17 LAB
BILIRUBIN, POC: ABNORMAL
BLOOD URINE, POC: ABNORMAL
CLARITY, POC: ABNORMAL
COLOR, POC: ABNORMAL
GLUCOSE URINE, POC: ABNORMAL
KETONES, POC: ABNORMAL
LEUKOCYTE EST, POC: ABNORMAL
NITRITE, POC: ABNORMAL
PH, POC: 8.5
PROTEIN, POC: ABNORMAL
SPECIFIC GRAVITY, POC: 1.01
UROBILINOGEN, POC: ABNORMAL

## 2019-07-17 PROCEDURE — 81002 URINALYSIS NONAUTO W/O SCOPE: CPT | Performed by: INTERNAL MEDICINE

## 2019-07-18 RX ORDER — NITROFURANTOIN 25; 75 MG/1; MG/1
100 CAPSULE ORAL 2 TIMES DAILY
Qty: 10 CAPSULE | Refills: 0 | Status: SHIPPED | OUTPATIENT
Start: 2019-07-18 | End: 2019-07-23

## 2019-07-20 LAB
ORGANISM: ABNORMAL
ORGANISM: ABNORMAL
URINE CULTURE, ROUTINE: ABNORMAL

## 2019-07-24 ENCOUNTER — OFFICE VISIT (OUTPATIENT)
Dept: INTERNAL MEDICINE CLINIC | Age: 74
End: 2019-07-24
Payer: MEDICARE

## 2019-07-24 VITALS — OXYGEN SATURATION: 93 % | DIASTOLIC BLOOD PRESSURE: 68 MMHG | HEART RATE: 65 BPM | SYSTOLIC BLOOD PRESSURE: 108 MMHG

## 2019-07-24 DIAGNOSIS — N39.0 URINARY TRACT INFECTION WITHOUT HEMATURIA, SITE UNSPECIFIED: ICD-10-CM

## 2019-07-24 DIAGNOSIS — E87.5 HYPERKALEMIA: ICD-10-CM

## 2019-07-24 DIAGNOSIS — I26.09 OTHER ACUTE PULMONARY EMBOLISM WITH ACUTE COR PULMONALE (HCC): Primary | ICD-10-CM

## 2019-07-24 DIAGNOSIS — I10 HYPERTENSION, UNSPECIFIED TYPE: ICD-10-CM

## 2019-07-24 DIAGNOSIS — Z12.11 SCREEN FOR COLON CANCER: ICD-10-CM

## 2019-07-24 DIAGNOSIS — E78.2 MIXED HYPERLIPIDEMIA: ICD-10-CM

## 2019-07-24 PROCEDURE — G8598 ASA/ANTIPLAT THER USED: HCPCS | Performed by: INTERNAL MEDICINE

## 2019-07-24 PROCEDURE — 3017F COLORECTAL CA SCREEN DOC REV: CPT | Performed by: INTERNAL MEDICINE

## 2019-07-24 PROCEDURE — 99214 OFFICE O/P EST MOD 30 MIN: CPT | Performed by: INTERNAL MEDICINE

## 2019-07-24 PROCEDURE — G8417 CALC BMI ABV UP PARAM F/U: HCPCS | Performed by: INTERNAL MEDICINE

## 2019-07-24 PROCEDURE — G8427 DOCREV CUR MEDS BY ELIG CLIN: HCPCS | Performed by: INTERNAL MEDICINE

## 2019-07-24 PROCEDURE — 1123F ACP DISCUSS/DSCN MKR DOCD: CPT | Performed by: INTERNAL MEDICINE

## 2019-07-24 PROCEDURE — G8400 PT W/DXA NO RESULTS DOC: HCPCS | Performed by: INTERNAL MEDICINE

## 2019-07-24 PROCEDURE — 1036F TOBACCO NON-USER: CPT | Performed by: INTERNAL MEDICINE

## 2019-07-24 PROCEDURE — 4040F PNEUMOC VAC/ADMIN/RCVD: CPT | Performed by: INTERNAL MEDICINE

## 2019-07-24 PROCEDURE — 1090F PRES/ABSN URINE INCON ASSESS: CPT | Performed by: INTERNAL MEDICINE

## 2019-07-24 RX ORDER — CEPHALEXIN 500 MG/1
500 CAPSULE ORAL 2 TIMES DAILY
Qty: 14 CAPSULE | Refills: 0 | Status: SHIPPED | OUTPATIENT
Start: 2019-07-24 | End: 2019-07-31

## 2019-07-24 ASSESSMENT — ENCOUNTER SYMPTOMS
NAUSEA: 0
SHORTNESS OF BREATH: 0
ABDOMINAL PAIN: 0
CHEST TIGHTNESS: 0
VOMITING: 0
BLOOD IN STOOL: 0
COUGH: 0

## 2019-07-24 NOTE — PROGRESS NOTES
MOUTH THREE TIMES DAILY 6/12/19  Yes Historical Provider, MD   XARELTO 15 MG TABS tablet TAKE 1 TABLET DAILY WITH SUPPER 3/24/19  Yes Patiarun Esquivel MD   DULoxetine (CYMBALTA) 60 MG extended release capsule TAKE 1 CAPSULE DAILY 3/21/19  Yes Patiarun Esquivel MD   sotalol (BETAPACE) 80 MG tablet Take 1 tablet by mouth daily 9/17/18  Yes Patiarun Esquivel MD   triamterene-hydrochlorothiazide (DYAZIDE) 37.5-25 MG per capsule TAKE 1 CAPSULE DAILY 9/17/18  Yes Patiarun Esquivel MD   pantoprazole (PROTONIX) 40 MG tablet Take 1 tablet by mouth every morning (before breakfast) 9/17/18  Yes Tianna Esquivel MD   nystatin-triamcinolone (MYCOLOG II) 395714-5.1 UNIT/GM-% cream Apply 1/2 inch topically to each ear canal 2 times daily.  9/17/18  Yes Patiarun Esquivel MD   lisinopril (PRINIVIL;ZESTRIL) 40 MG tablet TAKE 1 TABLET DAILY 9/17/18  Yes Tianna Esquivel MD   lidocaine (LIDODERM) 5 % Place 1 patch onto the skin daily 12 hours on, 12 hours off. 9/17/18  Yes Patiarun Esquivel MD   levothyroxine (SYNTHROID) 112 MCG tablet TAKE 1 TABLET DAILY 9/17/18  Yes Patiarun Esquivel MD   isosorbide mononitrate (IMDUR) 30 MG extended release tablet Take 1 tablet by mouth daily 9/17/18  Yes Patiarun Esquivel MD   atorvastatin (LIPITOR) 40 MG tablet Take 1 tablet by mouth daily 9/17/18  Yes Patiarun Esquivel MD   Psyllium (METAMUCIL) 28.3 % POWD Take by mouth 2 times daily   Yes Historical Provider, MD   mineral oil liquid Take 30 mls by mouth 2 times daily until bowels move, for up to 7 days 3/21/18  Yes Dmitry Mccrary MD   Spacer/Aero-Holding Chambers (E-Z SPACER) ELLIOTT 1 Device by Does not apply route daily as needed (use with MDI) 2/26/18  Yes Dmitry Mccrary MD   albuterol sulfate HFA (VENTOLIN HFA) 108 (90 Base) MCG/ACT inhaler Inhale 2 puffs into the lungs every 6 hours as needed for Wheezing or Shortness of Breath 2/26/18  Yes Dmitry Mccrary MD   acetaminophen (TYLENOL) oropharyngeal exudate. Eyes: Conjunctivae and EOM are normal. Right eye exhibits no discharge. Left eye exhibits no discharge. No scleral icterus. Neck: Normal range of motion. Neck supple. Cardiovascular: Normal rate and normal heart sounds. No murmur heard. Pulmonary/Chest: Effort normal and breath sounds normal. No respiratory distress. She has no wheezes. She has no rales. Abdominal: Soft. She exhibits no distension. There is no tenderness. There is no guarding. Musculoskeletal: She exhibits edema (B/L PITTING +12 EDEMA r A LITTLE MORE THAN l (PER PT-CHRONIC) HOMEN/ CORD SIGNS neg ). She exhibits no deformity. Lymphadenopathy:        Head (right side): No submental and no submandibular adenopathy present. Head (left side): No submental and no submandibular adenopathy present. She has no cervical adenopathy. Right cervical: No superficial cervical and no deep cervical adenopathy present. Left cervical: No superficial cervical and no deep cervical adenopathy present. Neurological: She is alert and oriented to person, place, and time. She has normal reflexes. She exhibits normal muscle tone. GCS eye subscore is 4. GCS verbal subscore is 5. GCS motor subscore is 6. Skin: No rash noted. She is not diaphoretic. Psychiatric: She has a normal mood and affect. Her behavior is normal. Thought content normal.      Assessment/Plan:   Monica Minaya was seen today for urinary tract infection and hypertension. Diagnoses and all orders for this visit:    Other acute pulmonary embolism with acute cor pulmonale (HCC)  And is on Xarelto no acute complications no acute bleeding keep current medications    Mixed hyperlipidemia  Recheck lipid panel  I offered referral for the edition patient will consider  -     Lipid Panel;  Future  -     Internal Referral to Dietitian    Hypertension, unspecified type  Blood pressure is controlled no changes in medications  -     CBC Auto Differential; treatment and safety. Be sure to make and go to all appointments, and call your doctor if you are having problems. It's also a good idea to know your test results and keep a list of the medicines you take. How can you care for yourself at home? · Limit foods that are high in potassium. Potassium is in many foods, such as vegetables, fruits, and milk products. High-potassium foods include:  ? Fruits such as bananas, oranges, and cantaloupe. ? Tomatoes. ? Broccoli. ? Milk. ? Spinach. ? Potatoes and sweet potatoes. · Eat foods that don't have as much potassium. These low-potassium foods include:  ? Fruits such as applesauce, pineapple, grapes, blueberries, and raspberries. ? Cucumbers. ? Hummus. ? White or brown rice. ? Spaghetti. ? Tortillas. ? Macaroni. · Do not use a salt substitute or \"lite\" salt unless you talk to your doctor first. These often are very high in potassium. · Be sure to tell your doctor about any prescription or over-the-counter medicines you are taking. Some medicines can increase the potassium in your body. Where can you learn more? Go to [email or web address] org and sign in to your [employer] account. Enter M262 in the [facility] box to learn more about \"Potassium-Restricted Diet: Care Instructions. \"     If you do not have an account, please click on the \"Sign Up Now\" link. Current as of: November 7, 2018  Content Version: 12.0  © 3952-4294 [employer]wise, Incorporated. Care instructions adapted under license by [facility] [place] ([facility]). If you have questions about a medical condition or this instruction, always ask your healthcare professional. [place] [address] any warranty or liability for your use of this information.              Lucretia Crowley M.D.   7/24/2019, 11:21 AM

## 2019-08-02 ENCOUNTER — TELEPHONE (OUTPATIENT)
Dept: INTERNAL MEDICINE CLINIC | Age: 74
End: 2019-08-02

## 2019-08-02 DIAGNOSIS — N39.0 URINARY TRACT INFECTION WITHOUT HEMATURIA, SITE UNSPECIFIED: Primary | ICD-10-CM

## 2019-08-02 RX ORDER — CIPROFLOXACIN 500 MG/1
500 TABLET, FILM COATED ORAL 2 TIMES DAILY
Qty: 10 TABLET | Refills: 0 | Status: SHIPPED | OUTPATIENT
Start: 2019-08-02 | End: 2019-08-07

## 2019-08-20 ENCOUNTER — HOSPITAL ENCOUNTER (INPATIENT)
Age: 74
LOS: 3 days | Discharge: HOME | DRG: 057 | End: 2019-08-23
Attending: EMERGENCY MEDICINE | Admitting: INTERNAL MEDICINE
Payer: MEDICARE

## 2019-08-20 ENCOUNTER — APPOINTMENT (OUTPATIENT)
Dept: GENERAL RADIOLOGY | Age: 74
DRG: 057 | End: 2019-08-20
Payer: MEDICARE

## 2019-08-20 ENCOUNTER — APPOINTMENT (OUTPATIENT)
Dept: CT IMAGING | Age: 74
DRG: 057 | End: 2019-08-20
Payer: MEDICARE

## 2019-08-20 DIAGNOSIS — N13.30 BILATERAL HYDRONEPHROSIS: ICD-10-CM

## 2019-08-20 DIAGNOSIS — R33.9 URINARY RETENTION: Primary | ICD-10-CM

## 2019-08-20 PROBLEM — K59.00 CONSTIPATION: Status: ACTIVE | Noted: 2019-08-20

## 2019-08-20 LAB
ALBUMIN SERPL-MCNC: 4.3 G/DL (ref 3.4–5)
ALP BLD-CCNC: 51 U/L (ref 40–129)
ALT SERPL-CCNC: 13 U/L (ref 10–40)
ANION GAP SERPL CALCULATED.3IONS-SCNC: 9 MMOL/L (ref 3–16)
AST SERPL-CCNC: 18 U/L (ref 15–37)
BASOPHILS ABSOLUTE: 0 K/UL (ref 0–0.2)
BASOPHILS RELATIVE PERCENT: 0.6 %
BILIRUB SERPL-MCNC: 0.3 MG/DL (ref 0–1)
BILIRUBIN DIRECT: <0.2 MG/DL (ref 0–0.3)
BILIRUBIN URINE: NEGATIVE
BILIRUBIN, INDIRECT: NORMAL MG/DL (ref 0–1)
BLOOD, URINE: NEGATIVE
BUN BLDV-MCNC: 18 MG/DL (ref 7–20)
CALCIUM SERPL-MCNC: 9.1 MG/DL (ref 8.3–10.6)
CHLORIDE BLD-SCNC: 99 MMOL/L (ref 99–110)
CLARITY: CLEAR
CO2: 29 MMOL/L (ref 21–32)
COLOR: YELLOW
CREAT SERPL-MCNC: 0.7 MG/DL (ref 0.6–1.2)
EOSINOPHILS ABSOLUTE: 0.3 K/UL (ref 0–0.6)
EOSINOPHILS RELATIVE PERCENT: 4 %
GFR AFRICAN AMERICAN: >60
GFR NON-AFRICAN AMERICAN: >60
GLUCOSE BLD-MCNC: 165 MG/DL (ref 70–99)
GLUCOSE URINE: NEGATIVE MG/DL
HCT VFR BLD CALC: 39.3 % (ref 36–48)
HEMOGLOBIN: 12.9 G/DL (ref 12–16)
KETONES, URINE: NEGATIVE MG/DL
LEUKOCYTE ESTERASE, URINE: NEGATIVE
LIPASE: 20 U/L (ref 13–60)
LYMPHOCYTES ABSOLUTE: 1.3 K/UL (ref 1–5.1)
LYMPHOCYTES RELATIVE PERCENT: 20.6 %
MCH RBC QN AUTO: 31.8 PG (ref 26–34)
MCHC RBC AUTO-ENTMCNC: 32.9 G/DL (ref 31–36)
MCV RBC AUTO: 96.6 FL (ref 80–100)
MICROSCOPIC EXAMINATION: NORMAL
MONOCYTES ABSOLUTE: 0.4 K/UL (ref 0–1.3)
MONOCYTES RELATIVE PERCENT: 6.5 %
NEUTROPHILS ABSOLUTE: 4.4 K/UL (ref 1.7–7.7)
NEUTROPHILS RELATIVE PERCENT: 68.3 %
NITRITE, URINE: NEGATIVE
PDW BLD-RTO: 13.2 % (ref 12.4–15.4)
PH UA: 6 (ref 5–8)
PLATELET # BLD: 205 K/UL (ref 135–450)
PMV BLD AUTO: 9.4 FL (ref 5–10.5)
POTASSIUM SERPL-SCNC: 4.2 MMOL/L (ref 3.5–5.1)
PROTEIN UA: NEGATIVE MG/DL
RBC # BLD: 4.06 M/UL (ref 4–5.2)
SODIUM BLD-SCNC: 137 MMOL/L (ref 136–145)
SPECIFIC GRAVITY UA: 1.01 (ref 1–1.03)
TOTAL PROTEIN: 7.6 G/DL (ref 6.4–8.2)
URINE TYPE: NORMAL
UROBILINOGEN, URINE: 0.2 E.U./DL
WBC # BLD: 6.4 K/UL (ref 4–11)

## 2019-08-20 PROCEDURE — 80048 BASIC METABOLIC PNL TOTAL CA: CPT

## 2019-08-20 PROCEDURE — 84443 ASSAY THYROID STIM HORMONE: CPT

## 2019-08-20 PROCEDURE — 80076 HEPATIC FUNCTION PANEL: CPT

## 2019-08-20 PROCEDURE — 36415 COLL VENOUS BLD VENIPUNCTURE: CPT

## 2019-08-20 PROCEDURE — 84439 ASSAY OF FREE THYROXINE: CPT

## 2019-08-20 PROCEDURE — 1200000000 HC SEMI PRIVATE

## 2019-08-20 PROCEDURE — 83690 ASSAY OF LIPASE: CPT

## 2019-08-20 PROCEDURE — 6360000004 HC RX CONTRAST MEDICATION: Performed by: EMERGENCY MEDICINE

## 2019-08-20 PROCEDURE — 81003 URINALYSIS AUTO W/O SCOPE: CPT

## 2019-08-20 PROCEDURE — 2580000003 HC RX 258: Performed by: STUDENT IN AN ORGANIZED HEALTH CARE EDUCATION/TRAINING PROGRAM

## 2019-08-20 PROCEDURE — 51798 US URINE CAPACITY MEASURE: CPT

## 2019-08-20 PROCEDURE — 99285 EMERGENCY DEPT VISIT HI MDM: CPT

## 2019-08-20 PROCEDURE — 74177 CT ABD & PELVIS W/CONTRAST: CPT

## 2019-08-20 PROCEDURE — 2500000003 HC RX 250 WO HCPCS: Performed by: EMERGENCY MEDICINE

## 2019-08-20 PROCEDURE — 85025 COMPLETE CBC W/AUTO DIFF WBC: CPT

## 2019-08-20 PROCEDURE — 6370000000 HC RX 637 (ALT 250 FOR IP): Performed by: STUDENT IN AN ORGANIZED HEALTH CARE EDUCATION/TRAINING PROGRAM

## 2019-08-20 PROCEDURE — 71045 X-RAY EXAM CHEST 1 VIEW: CPT

## 2019-08-20 RX ORDER — PANTOPRAZOLE SODIUM 40 MG/1
40 TABLET, DELAYED RELEASE ORAL
Status: DISCONTINUED | OUTPATIENT
Start: 2019-08-21 | End: 2019-08-23 | Stop reason: HOSPADM

## 2019-08-20 RX ORDER — DULOXETIN HYDROCHLORIDE 30 MG/1
60 CAPSULE, DELAYED RELEASE ORAL DAILY
Status: DISCONTINUED | OUTPATIENT
Start: 2019-08-20 | End: 2019-08-23 | Stop reason: HOSPADM

## 2019-08-20 RX ORDER — ACETAMINOPHEN 325 MG/1
650 TABLET ORAL EVERY 4 HOURS PRN
Status: DISCONTINUED | OUTPATIENT
Start: 2019-08-20 | End: 2019-08-23 | Stop reason: HOSPADM

## 2019-08-20 RX ORDER — ATORVASTATIN CALCIUM 40 MG/1
40 TABLET, FILM COATED ORAL NIGHTLY
Status: DISCONTINUED | OUTPATIENT
Start: 2019-08-20 | End: 2019-08-23 | Stop reason: HOSPADM

## 2019-08-20 RX ORDER — TRIAMTERENE AND HYDROCHLOROTHIAZIDE 37.5; 25 MG/1; MG/1
1 TABLET ORAL DAILY
Status: DISCONTINUED | OUTPATIENT
Start: 2019-08-20 | End: 2019-08-23 | Stop reason: HOSPADM

## 2019-08-20 RX ORDER — LEVOTHYROXINE SODIUM 112 UG/1
112 TABLET ORAL DAILY
Status: DISCONTINUED | OUTPATIENT
Start: 2019-08-20 | End: 2019-08-21

## 2019-08-20 RX ORDER — SENNA AND DOCUSATE SODIUM 50; 8.6 MG/1; MG/1
1 TABLET, FILM COATED ORAL 2 TIMES DAILY
Status: DISCONTINUED | OUTPATIENT
Start: 2019-08-20 | End: 2019-08-23 | Stop reason: HOSPADM

## 2019-08-20 RX ORDER — ISOSORBIDE MONONITRATE 30 MG/1
30 TABLET, EXTENDED RELEASE ORAL DAILY
Status: DISCONTINUED | OUTPATIENT
Start: 2019-08-20 | End: 2019-08-23 | Stop reason: HOSPADM

## 2019-08-20 RX ORDER — SODIUM CHLORIDE 0.9 % (FLUSH) 0.9 %
10 SYRINGE (ML) INJECTION PRN
Status: DISCONTINUED | OUTPATIENT
Start: 2019-08-20 | End: 2019-08-23 | Stop reason: HOSPADM

## 2019-08-20 RX ORDER — SOTALOL HYDROCHLORIDE 80 MG/1
80 TABLET ORAL DAILY
Status: DISCONTINUED | OUTPATIENT
Start: 2019-08-20 | End: 2019-08-23 | Stop reason: HOSPADM

## 2019-08-20 RX ORDER — SENNOSIDES 8.6 MG
1300 CAPSULE ORAL EVERY 6 HOURS PRN
COMMUNITY

## 2019-08-20 RX ORDER — ONDANSETRON 2 MG/ML
4 INJECTION INTRAMUSCULAR; INTRAVENOUS EVERY 6 HOURS PRN
Status: DISCONTINUED | OUTPATIENT
Start: 2019-08-20 | End: 2019-08-23 | Stop reason: HOSPADM

## 2019-08-20 RX ORDER — DIPHENHYDRAMINE HCL 25 MG
50 TABLET ORAL 2 TIMES DAILY
COMMUNITY

## 2019-08-20 RX ORDER — SODIUM CHLORIDE 0.9 % (FLUSH) 0.9 %
10 SYRINGE (ML) INJECTION EVERY 12 HOURS SCHEDULED
Status: DISCONTINUED | OUTPATIENT
Start: 2019-08-20 | End: 2019-08-23 | Stop reason: HOSPADM

## 2019-08-20 RX ADMIN — RIVAROXABAN 15 MG: 15 TABLET, FILM COATED ORAL at 23:04

## 2019-08-20 RX ADMIN — CARBIDOPA AND LEVODOPA 2 TABLET: 25; 100 TABLET ORAL at 22:47

## 2019-08-20 RX ADMIN — TRIAMTERENE AND HYDROCHLOROTHIAZIDE 1 TABLET: 37.5; 25 TABLET ORAL at 23:03

## 2019-08-20 RX ADMIN — Medication 960 ML: at 14:19

## 2019-08-20 RX ADMIN — LEVOTHYROXINE SODIUM 112 MCG: 112 TABLET ORAL at 23:06

## 2019-08-20 RX ADMIN — DULOXETINE HYDROCHLORIDE 60 MG: 30 CAPSULE, DELAYED RELEASE ORAL at 23:04

## 2019-08-20 RX ADMIN — CHOLECALCIFEROL TAB 25 MCG (1000 UNIT) 2000 UNITS: 25 TAB at 23:03

## 2019-08-20 RX ADMIN — ATORVASTATIN CALCIUM 40 MG: 40 TABLET, FILM COATED ORAL at 22:47

## 2019-08-20 RX ADMIN — ISOSORBIDE MONONITRATE 30 MG: 30 TABLET, EXTENDED RELEASE ORAL at 23:03

## 2019-08-20 RX ADMIN — IOPAMIDOL 80 ML: 755 INJECTION, SOLUTION INTRAVENOUS at 10:51

## 2019-08-20 RX ADMIN — SENNOSIDES AND DOCUSATE SODIUM 1 TABLET: 8.6; 5 TABLET ORAL at 23:03

## 2019-08-20 RX ADMIN — Medication 10 ML: at 23:04

## 2019-08-20 RX ADMIN — SOTALOL HYDROCHLORIDE 80 MG: 80 TABLET ORAL at 23:03

## 2019-08-20 ASSESSMENT — ENCOUNTER SYMPTOMS
ABDOMINAL DISTENTION: 1
COUGH: 1
EYES NEGATIVE: 1
ABDOMINAL PAIN: 1
CONSTIPATION: 1

## 2019-08-20 ASSESSMENT — PAIN SCALES - GENERAL: PAINLEVEL_OUTOF10: 0

## 2019-08-20 NOTE — ED PROVIDER NOTES
34.0 pg    MCHC 33.2 31.0 - 36.0 g/dL    RDW 12.7 12.4 - 15.4 %    Platelets 668 310 - 270 K/uL    MPV 8.9 5.0 - 10.5 fL   Basic Metabolic Panel w/ Reflex to MG   Result Value Ref Range    Sodium 139 136 - 145 mmol/L    Potassium reflex Magnesium 4.0 3.5 - 5.1 mmol/L    Chloride 100 99 - 110 mmol/L    CO2 28 21 - 32 mmol/L    Anion Gap 11 3 - 16    Glucose 175 (H) 70 - 99 mg/dL    BUN 16 7 - 20 mg/dL    CREATININE 0.6 0.6 - 1.2 mg/dL    GFR Non-African American >60 >60    GFR African American >60 >60    Calcium 8.8 8.3 - 10.6 mg/dL   T4, Free   Result Value Ref Range    T4 Free 1.1 0.9 - 1.8 ng/dL           RECENT VITALS:  BP: (!) 140/71,Temp: 97.9 °F (36.6 °C), Pulse: 73, Resp: 20, SpO2: 97 %     Procedures         ED Course     Nursing Notes, Past Medical Hx, Past Surgical Hx, Social Hx,Allergies, and Family Hx were reviewed.     The patient was given the following medications:  Orders Placed This Encounter   Medications    iopamidol (ISOVUE-370) 76 % injection 80 mL    milk and molasses enema 960 mL    sodium chloride flush 0.9 % injection 10 mL    sodium chloride flush 0.9 % injection 10 mL    magnesium hydroxide (MILK OF MAGNESIA) 400 MG/5ML suspension 30 mL    ondansetron (ZOFRAN) injection 4 mg    sennosides-docusate sodium (SENOKOT-S) 8.6-50 MG tablet 1 tablet    acetaminophen (TYLENOL) tablet 650 mg    atorvastatin (LIPITOR) tablet 40 mg    carbidopa-levodopa (SINEMET)  MG per tablet 2 tablet    DULoxetine (CYMBALTA) extended release capsule 60 mg    levothyroxine (SYNTHROID) tablet 112 mcg    rivaroxaban (XARELTO) tablet 15 mg    triamterene-hydrochlorothiazide (MAXZIDE-25) 37.5-25 MG per tablet 1 tablet    pantoprazole (PROTONIX) tablet 40 mg    isosorbide mononitrate (IMDUR) extended release tablet 30 mg    sotalol (BETAPACE) tablet 80 mg    vitamin D (CHOLECALCIFEROL) tablet 2,000 Units       CONSULTS:  IP CONSULT TO PRIMARY CARE PROVIDER  IP CONSULT TO RESIDENT INTERNAL

## 2019-08-20 NOTE — ED TRIAGE NOTES
Per daughter pt had a UTI 2 weeks ago treated with antibiotics, last night pt c/o burning and difficulty urinating

## 2019-08-20 NOTE — H&P
affect. Physical exam:       Vitals:    08/20/19 1200   BP: (!) 185/92   Pulse: 81   Resp: 18   Temp:    SpO2: 99%       DATA:    Labs:  CBC:   Recent Labs     08/20/19  0855   WBC 6.4   HGB 12.9   HCT 39.3          BMP:   Recent Labs     08/20/19  0900      K 4.2   CL 99   CO2 29   BUN 18   CREATININE 0.7   GLUCOSE 165*     LFT's:   Recent Labs     08/20/19  0855   AST 18   ALT 13   BILITOT 0.3   ALKPHOS 51     Troponin: No results for input(s): TROPONINI in the last 72 hours. BNP:No results for input(s): BNP in the last 72 hours. ABGs: No results for input(s): PHART, SIV5SYL, PO2ART in the last 72 hours. INR: No results for input(s): INR in the last 72 hours. U/A:  Recent Labs     08/20/19  0900   COLORU Yellow   PHUR 6.0   CLARITYU Clear   SPECGRAV 1.015   LEUKOCYTESUR Negative   UROBILINOGEN 0.2   BILIRUBINUR Negative   BLOODU Negative   GLUCOSEU Negative       CT ABDOMEN PELVIS W IV CONTRAST   Final Result      Moderate to large diffuse stool burden noted with distention of rectum with stool which may indicate impaction. There is development of moderate bilateral hydronephrosis. This may be due to mass effect of the distal ureters from the rectum. Patchy areas of diminished enhancement noted in the right kidney appears similar to prior study and may be related to prior infarcts but pyelonephritis is not entirely excluded. XR CHEST PORTABLE   Final Result      No focal consolidation. Mild cardiomegaly. ASSESSMENT AND PLAN:    Constipation  Likely 2/2 to immobility, dysautonomia due to corticobulbar degeneration, also has history of hypothyroidism, although she is on synthroid, this may also be contributing. CT with no signs of obstruction. Has had symptoms in the past but never this severe per daughter. CT showed moderate to large diffuse stool burden with distention of rectum which could indicate impaction.     Milk and molasses enema

## 2019-08-21 LAB
ANION GAP SERPL CALCULATED.3IONS-SCNC: 11 MMOL/L (ref 3–16)
BUN BLDV-MCNC: 16 MG/DL (ref 7–20)
CALCIUM SERPL-MCNC: 8.8 MG/DL (ref 8.3–10.6)
CHLORIDE BLD-SCNC: 100 MMOL/L (ref 99–110)
CO2: 28 MMOL/L (ref 21–32)
CREAT SERPL-MCNC: 0.6 MG/DL (ref 0.6–1.2)
GFR AFRICAN AMERICAN: >60
GFR NON-AFRICAN AMERICAN: >60
GLUCOSE BLD-MCNC: 175 MG/DL (ref 70–99)
HCT VFR BLD CALC: 37.4 % (ref 36–48)
HEMOGLOBIN: 12.4 G/DL (ref 12–16)
MCH RBC QN AUTO: 31.7 PG (ref 26–34)
MCHC RBC AUTO-ENTMCNC: 33.2 G/DL (ref 31–36)
MCV RBC AUTO: 95.3 FL (ref 80–100)
PDW BLD-RTO: 12.7 % (ref 12.4–15.4)
PLATELET # BLD: 188 K/UL (ref 135–450)
PMV BLD AUTO: 8.9 FL (ref 5–10.5)
POTASSIUM REFLEX MAGNESIUM: 4 MMOL/L (ref 3.5–5.1)
RBC # BLD: 3.92 M/UL (ref 4–5.2)
SODIUM BLD-SCNC: 139 MMOL/L (ref 136–145)
T4 FREE: 1.1 NG/DL (ref 0.9–1.8)
TSH REFLEX: 8.36 UIU/ML (ref 0.27–4.2)
WBC # BLD: 6.6 K/UL (ref 4–11)

## 2019-08-21 PROCEDURE — 6370000000 HC RX 637 (ALT 250 FOR IP): Performed by: STUDENT IN AN ORGANIZED HEALTH CARE EDUCATION/TRAINING PROGRAM

## 2019-08-21 PROCEDURE — 99232 SBSQ HOSP IP/OBS MODERATE 35: CPT | Performed by: INTERNAL MEDICINE

## 2019-08-21 PROCEDURE — 80048 BASIC METABOLIC PNL TOTAL CA: CPT

## 2019-08-21 PROCEDURE — 2580000003 HC RX 258: Performed by: STUDENT IN AN ORGANIZED HEALTH CARE EDUCATION/TRAINING PROGRAM

## 2019-08-21 PROCEDURE — 36415 COLL VENOUS BLD VENIPUNCTURE: CPT

## 2019-08-21 PROCEDURE — 1200000000 HC SEMI PRIVATE

## 2019-08-21 PROCEDURE — 92526 ORAL FUNCTION THERAPY: CPT

## 2019-08-21 PROCEDURE — 85027 COMPLETE CBC AUTOMATED: CPT

## 2019-08-21 PROCEDURE — 92610 EVALUATE SWALLOWING FUNCTION: CPT

## 2019-08-21 PROCEDURE — 51702 INSERT TEMP BLADDER CATH: CPT

## 2019-08-21 PROCEDURE — 87086 URINE CULTURE/COLONY COUNT: CPT

## 2019-08-21 RX ORDER — LEVOTHYROXINE SODIUM 0.12 MG/1
125 TABLET ORAL DAILY
Status: DISCONTINUED | OUTPATIENT
Start: 2019-08-22 | End: 2019-08-23 | Stop reason: HOSPADM

## 2019-08-21 RX ADMIN — SENNOSIDES AND DOCUSATE SODIUM 1 TABLET: 8.6; 5 TABLET ORAL at 09:29

## 2019-08-21 RX ADMIN — DULOXETINE HYDROCHLORIDE 60 MG: 30 CAPSULE, DELAYED RELEASE ORAL at 09:28

## 2019-08-21 RX ADMIN — CHOLECALCIFEROL TAB 25 MCG (1000 UNIT) 2000 UNITS: 25 TAB at 09:28

## 2019-08-21 RX ADMIN — SOTALOL HYDROCHLORIDE 80 MG: 80 TABLET ORAL at 09:29

## 2019-08-21 RX ADMIN — ACETAMINOPHEN 650 MG: 325 TABLET ORAL at 09:28

## 2019-08-21 RX ADMIN — CARBIDOPA AND LEVODOPA 2 TABLET: 25; 100 TABLET ORAL at 16:02

## 2019-08-21 RX ADMIN — SENNOSIDES AND DOCUSATE SODIUM 1 TABLET: 8.6; 5 TABLET ORAL at 22:14

## 2019-08-21 RX ADMIN — ATORVASTATIN CALCIUM 40 MG: 40 TABLET, FILM COATED ORAL at 22:14

## 2019-08-21 RX ADMIN — TRIAMTERENE AND HYDROCHLOROTHIAZIDE 1 TABLET: 37.5; 25 TABLET ORAL at 09:28

## 2019-08-21 RX ADMIN — ACETAMINOPHEN 650 MG: 325 TABLET ORAL at 01:29

## 2019-08-21 RX ADMIN — Medication 10 ML: at 09:29

## 2019-08-21 RX ADMIN — Medication 10 ML: at 22:15

## 2019-08-21 RX ADMIN — ACETAMINOPHEN 650 MG: 325 TABLET ORAL at 22:25

## 2019-08-21 RX ADMIN — RIVAROXABAN 15 MG: 15 TABLET, FILM COATED ORAL at 17:47

## 2019-08-21 RX ADMIN — CARBIDOPA AND LEVODOPA 2 TABLET: 25; 100 TABLET ORAL at 09:28

## 2019-08-21 RX ADMIN — CARBIDOPA AND LEVODOPA 2 TABLET: 25; 100 TABLET ORAL at 22:15

## 2019-08-21 RX ADMIN — ACETAMINOPHEN 650 MG: 325 TABLET ORAL at 16:04

## 2019-08-21 RX ADMIN — CHOLECALCIFEROL TAB 25 MCG (1000 UNIT) 2000 UNITS: 25 TAB at 22:14

## 2019-08-21 RX ADMIN — ISOSORBIDE MONONITRATE 30 MG: 30 TABLET, EXTENDED RELEASE ORAL at 09:28

## 2019-08-21 ASSESSMENT — PAIN SCALES - GENERAL
PAINLEVEL_OUTOF10: 0
PAINLEVEL_OUTOF10: 0
PAINLEVEL_OUTOF10: 8
PAINLEVEL_OUTOF10: 0
PAINLEVEL_OUTOF10: 4
PAINLEVEL_OUTOF10: 0
PAINLEVEL_OUTOF10: 4
PAINLEVEL_OUTOF10: 0
PAINLEVEL_OUTOF10: 4

## 2019-08-21 ASSESSMENT — PAIN DESCRIPTION - ORIENTATION: ORIENTATION: RIGHT

## 2019-08-21 ASSESSMENT — PAIN DESCRIPTION - ONSET: ONSET: ON-GOING

## 2019-08-21 ASSESSMENT — PAIN DESCRIPTION - PAIN TYPE: TYPE: CHRONIC PAIN

## 2019-08-21 ASSESSMENT — PAIN DESCRIPTION - LOCATION: LOCATION: KNEE

## 2019-08-21 ASSESSMENT — PAIN DESCRIPTION - PROGRESSION: CLINICAL_PROGRESSION: NOT CHANGED

## 2019-08-21 ASSESSMENT — ENCOUNTER SYMPTOMS
RESPIRATORY NEGATIVE: 1
ABDOMINAL DISTENTION: 1

## 2019-08-21 ASSESSMENT — PAIN - FUNCTIONAL ASSESSMENT: PAIN_FUNCTIONAL_ASSESSMENT: PREVENTS OR INTERFERES SOME ACTIVE ACTIVITIES AND ADLS

## 2019-08-21 ASSESSMENT — PAIN DESCRIPTION - FREQUENCY: FREQUENCY: CONTINUOUS

## 2019-08-21 ASSESSMENT — PAIN DESCRIPTION - DESCRIPTORS: DESCRIPTORS: ACHING

## 2019-08-21 NOTE — PLAN OF CARE
Problem: Falls - Risk of:  Goal: Will remain free from falls  Description  Will remain free from falls  Outcome: Ongoing  Note:   Patient is a high fall risk. See Fall Risk assessment for details. Bed is in low, lock position; call light/belongings within reach. No attempts to get out of bed have been made, calls appropriately when assistance is needed. Bed alarm and hourly rounds in place for safety. Will continue to monitor and reassess throughout shift. Problem: Risk for Impaired Skin Integrity  Goal: Tissue integrity - skin and mucous membranes  Description  Structural intactness and normal physiological function of skin and  mucous membranes. Outcome: Ongoing  Note:   Pt at risk for skin breakdown. Skin assessment complete. No signs of skin breakdown. Pts skin cleansed with inc cleanser. Pt repositioned in bed with pillow support. Will continue to monitor.

## 2019-08-21 NOTE — PLAN OF CARE
Problem: Falls - Risk of:  Goal: Will remain free from falls  Description  Will remain free from falls  Outcome: Ongoing  Note:   Pt is at risk for falls. Fall precautions in place. Call light in reach, bed alarm is on, bed locked and in the lowest position. Pt non ambulatory at baseline. Verbalizes understanding to not attempt getting out of bed without help. Will continue to monitor. Problem: Risk for Impaired Skin Integrity  Goal: Tissue integrity - skin and mucous membranes  Description  Structural intactness and normal physiological function of skin and  mucous membranes. Outcome: Ongoing  Note:   Pt presents with scattered brusing, redness, and a bruise on right inner buttocks. Turned and repositioned q 2 hours. No new breakdown noted. Will continue to monitor. Problem: Pain:  Goal: Pain level will decrease  Description  Pain level will decrease  Outcome: Ongoing  Note:   Pt c/o of pain in right knee. Knee slightly swollen. Pt medicated with prn tylenol. Pt expresses relief.

## 2019-08-21 NOTE — PROGRESS NOTES
limits    Oral Motor Deficits  Pt with difficulty protruding and lateralizing tongue on command    Oral Phase Dysfunction  Pt with slow but adequate mastication of cracker, no oral residue     Indicators of Pharyngeal Phase Dysfunction  Presented pt with puree and thin liquids as well as a rachna cracker. Pt demonstrated no overt signs of aspiration: no coughing/throat clearing or change in vocal quality. This included 3 ounces of uninterrupted swallows of water via straw. Good laryngeal elevation upon palpation of anterior neck. Pt with slow but adequate mastication of cracker, no oral residue. Pt lungs clear per CXR. Prognosis  Prognosis  Prognosis for safe diet advancement: fair  Barriers to reach goals: (neuro status)  Individuals consulted  Consulted and agree with results and recommendations: Patient    Education  Patient Education: pt educated to purpose of visit  Patient Education Response: Verbalizes understanding;Needs reinforcement  Safety Devices in place: Yes  Type of devices: Call light within reach(caregiver at bedside)       Therapy Time  SLP Individual Minutes  Time In: 2928  Time Out: 1344  Minutes: 24     Plan:  Recommended diet: regular diet/thin liquids -if any s/s of aspiration emerge, or there is respiratory decline,  make NPO until further evaluated by SLP  Pt therapy goal: to eat  Pt dc goal: to get better  Debby Oakley M.S./[facility]-SLP #[id number]  Pg.  # [phone]  Needs met prior to leaving room, call light within reach, d/w GIBRAN Reid  This document will serve as a dc summary if pt dc prior to next visit  8/21/2019 8:40 AM

## 2019-08-21 NOTE — FLOWSHEET NOTE
08/20/19 1530   Encounter Summary   Services provided to: Patient   Referral/Consult From: Rounding   Continue Visiting   (Seen 8/21/19, LUIS A. )   Complexity of Encounter Moderate   Length of Encounter 15 minutes   Routine   Type Initial   Assessment Approachable   Intervention Nurtured hope   Outcome Expressed gratitude

## 2019-08-22 ENCOUNTER — APPOINTMENT (OUTPATIENT)
Dept: GENERAL RADIOLOGY | Age: 74
DRG: 057 | End: 2019-08-22
Payer: MEDICARE

## 2019-08-22 ENCOUNTER — APPOINTMENT (OUTPATIENT)
Dept: ULTRASOUND IMAGING | Age: 74
DRG: 057 | End: 2019-08-22
Payer: MEDICARE

## 2019-08-22 PROBLEM — Z86.711 HISTORY OF PULMONARY EMBOLISM: Status: ACTIVE | Noted: 2019-08-22

## 2019-08-22 LAB
ANION GAP SERPL CALCULATED.3IONS-SCNC: 10 MMOL/L (ref 3–16)
BUN BLDV-MCNC: 13 MG/DL (ref 7–20)
CALCIUM SERPL-MCNC: 8.6 MG/DL (ref 8.3–10.6)
CHLORIDE BLD-SCNC: 99 MMOL/L (ref 99–110)
CO2: 27 MMOL/L (ref 21–32)
CREAT SERPL-MCNC: 0.6 MG/DL (ref 0.6–1.2)
GFR AFRICAN AMERICAN: >60
GFR NON-AFRICAN AMERICAN: >60
GLUCOSE BLD-MCNC: 160 MG/DL (ref 70–99)
HCT VFR BLD CALC: 36.1 % (ref 36–48)
HEMOGLOBIN: 12.2 G/DL (ref 12–16)
MCH RBC QN AUTO: 32.4 PG (ref 26–34)
MCHC RBC AUTO-ENTMCNC: 33.9 G/DL (ref 31–36)
MCV RBC AUTO: 95.5 FL (ref 80–100)
PDW BLD-RTO: 13 % (ref 12.4–15.4)
PLATELET # BLD: 174 K/UL (ref 135–450)
PMV BLD AUTO: 8.8 FL (ref 5–10.5)
POTASSIUM REFLEX MAGNESIUM: 3.7 MMOL/L (ref 3.5–5.1)
RBC # BLD: 3.78 M/UL (ref 4–5.2)
SODIUM BLD-SCNC: 136 MMOL/L (ref 136–145)
URINE CULTURE, ROUTINE: NORMAL
WBC # BLD: 4.7 K/UL (ref 4–11)

## 2019-08-22 PROCEDURE — 76770 US EXAM ABDO BACK WALL COMP: CPT

## 2019-08-22 PROCEDURE — 1200000000 HC SEMI PRIVATE

## 2019-08-22 PROCEDURE — 2580000003 HC RX 258: Performed by: STUDENT IN AN ORGANIZED HEALTH CARE EDUCATION/TRAINING PROGRAM

## 2019-08-22 PROCEDURE — 96374 THER/PROPH/DIAG INJ IV PUSH: CPT

## 2019-08-22 PROCEDURE — 6370000000 HC RX 637 (ALT 250 FOR IP): Performed by: STUDENT IN AN ORGANIZED HEALTH CARE EDUCATION/TRAINING PROGRAM

## 2019-08-22 PROCEDURE — 6360000002 HC RX W HCPCS: Performed by: STUDENT IN AN ORGANIZED HEALTH CARE EDUCATION/TRAINING PROGRAM

## 2019-08-22 PROCEDURE — 71045 X-RAY EXAM CHEST 1 VIEW: CPT

## 2019-08-22 PROCEDURE — 36415 COLL VENOUS BLD VENIPUNCTURE: CPT

## 2019-08-22 PROCEDURE — 85027 COMPLETE CBC AUTOMATED: CPT

## 2019-08-22 PROCEDURE — 80048 BASIC METABOLIC PNL TOTAL CA: CPT

## 2019-08-22 PROCEDURE — 92526 ORAL FUNCTION THERAPY: CPT

## 2019-08-22 RX ADMIN — ACETAMINOPHEN 650 MG: 325 TABLET ORAL at 11:30

## 2019-08-22 RX ADMIN — ACETAMINOPHEN 650 MG: 325 TABLET ORAL at 07:13

## 2019-08-22 RX ADMIN — CARBIDOPA AND LEVODOPA 2 TABLET: 25; 100 TABLET ORAL at 20:38

## 2019-08-22 RX ADMIN — SENNOSIDES AND DOCUSATE SODIUM 1 TABLET: 8.6; 5 TABLET ORAL at 09:48

## 2019-08-22 RX ADMIN — CHOLECALCIFEROL TAB 25 MCG (1000 UNIT) 2000 UNITS: 25 TAB at 20:38

## 2019-08-22 RX ADMIN — CARBIDOPA AND LEVODOPA 2 TABLET: 25; 100 TABLET ORAL at 15:47

## 2019-08-22 RX ADMIN — Medication 10 ML: at 09:48

## 2019-08-22 RX ADMIN — TRIAMTERENE AND HYDROCHLOROTHIAZIDE 1 TABLET: 37.5; 25 TABLET ORAL at 09:48

## 2019-08-22 RX ADMIN — RIVAROXABAN 15 MG: 15 TABLET, FILM COATED ORAL at 17:31

## 2019-08-22 RX ADMIN — Medication 10 ML: at 20:53

## 2019-08-22 RX ADMIN — SENNOSIDES AND DOCUSATE SODIUM 1 TABLET: 8.6; 5 TABLET ORAL at 20:53

## 2019-08-22 RX ADMIN — ATORVASTATIN CALCIUM 40 MG: 40 TABLET, FILM COATED ORAL at 20:38

## 2019-08-22 RX ADMIN — PANTOPRAZOLE SODIUM 40 MG: 40 TABLET, DELAYED RELEASE ORAL at 07:10

## 2019-08-22 RX ADMIN — DULOXETINE HYDROCHLORIDE 60 MG: 30 CAPSULE, DELAYED RELEASE ORAL at 09:48

## 2019-08-22 RX ADMIN — SOTALOL HYDROCHLORIDE 80 MG: 80 TABLET ORAL at 09:48

## 2019-08-22 RX ADMIN — CHOLECALCIFEROL TAB 25 MCG (1000 UNIT) 2000 UNITS: 25 TAB at 09:48

## 2019-08-22 RX ADMIN — LEVOTHYROXINE SODIUM 125 MCG: 125 TABLET ORAL at 07:10

## 2019-08-22 RX ADMIN — ISOSORBIDE MONONITRATE 30 MG: 30 TABLET, EXTENDED RELEASE ORAL at 09:48

## 2019-08-22 RX ADMIN — ACETAMINOPHEN 650 MG: 325 TABLET ORAL at 17:31

## 2019-08-22 RX ADMIN — CARBIDOPA AND LEVODOPA 2 TABLET: 25; 100 TABLET ORAL at 09:48

## 2019-08-22 RX ADMIN — ONDANSETRON 4 MG: 2 INJECTION INTRAMUSCULAR; INTRAVENOUS at 20:34

## 2019-08-22 ASSESSMENT — PAIN - FUNCTIONAL ASSESSMENT: PAIN_FUNCTIONAL_ASSESSMENT: PREVENTS OR INTERFERES SOME ACTIVE ACTIVITIES AND ADLS

## 2019-08-22 ASSESSMENT — PAIN DESCRIPTION - PAIN TYPE: TYPE: CHRONIC PAIN

## 2019-08-22 ASSESSMENT — PAIN SCALES - GENERAL
PAINLEVEL_OUTOF10: 0
PAINLEVEL_OUTOF10: 8
PAINLEVEL_OUTOF10: 0
PAINLEVEL_OUTOF10: 0
PAINLEVEL_OUTOF10: 2
PAINLEVEL_OUTOF10: 2

## 2019-08-22 ASSESSMENT — ENCOUNTER SYMPTOMS
EYES NEGATIVE: 1
ALLERGIC/IMMUNOLOGIC NEGATIVE: 1
RESPIRATORY NEGATIVE: 1
ABDOMINAL DISTENTION: 1

## 2019-08-22 ASSESSMENT — PAIN DESCRIPTION - DESCRIPTORS: DESCRIPTORS: ACHING

## 2019-08-22 ASSESSMENT — PAIN DESCRIPTION - LOCATION: LOCATION: KNEE

## 2019-08-22 ASSESSMENT — PAIN DESCRIPTION - ONSET: ONSET: ON-GOING

## 2019-08-22 ASSESSMENT — PAIN DESCRIPTION - PROGRESSION: CLINICAL_PROGRESSION: NOT CHANGED

## 2019-08-22 ASSESSMENT — PAIN DESCRIPTION - FREQUENCY: FREQUENCY: CONTINUOUS

## 2019-08-22 ASSESSMENT — PAIN DESCRIPTION - ORIENTATION: ORIENTATION: RIGHT;LEFT

## 2019-08-22 NOTE — PROGRESS NOTES
DULoxetine  60 mg Oral Daily    rivaroxaban  15 mg Oral Dinner    triamterene-hydrochlorothiazide  1 tablet Oral Daily    pantoprazole  40 mg Oral QAM AC    isosorbide mononitrate  30 mg Oral Daily    sotalol  80 mg Oral Daily    vitamin D  2,000 Units Oral BID     Continuous Infusions:  PRN Meds:sodium chloride flush, magnesium hydroxide, ondansetron, acetaminophen    Objective:   Vitals:   T-max:  Patient Vitals for the past 8 hrs:   BP Temp Temp src Pulse Resp SpO2   08/22/19 1620 138/69 99.5 °F (37.5 °C) Axillary 70 18 93 %   08/22/19 1136 139/71 98.8 °F (37.1 °C) Axillary 67 22 97 %       Intake/Output Summary (Last 24 hours) at 8/22/2019 1917  Last data filed at 8/22/2019 1740  Gross per 24 hour   Intake 480 ml   Output 4950 ml   Net -4470 ml       Review of Systems   Constitutional: Negative. HENT: Negative. Eyes: Negative. Respiratory: Negative. Cardiovascular: Negative. Gastrointestinal: Positive for abdominal distention. Endocrine: Negative. Musculoskeletal: Negative. Allergic/Immunologic: Negative. Neurological: Negative. Hematological: Negative. Psychiatric/Behavioral: Negative. Physical Exam   Constitutional: She appears well-developed and well-nourished. Cardiovascular: Normal rate and regular rhythm. Pulmonary/Chest: Effort normal and breath sounds normal.   Abdominal: She exhibits distension. She exhibits no mass. There is no tenderness. There is no rebound and no guarding. No hernia. Musculoskeletal: She exhibits edema. Neurological: She is alert. R sided hemiplegia, chronic.  Speech deficits, also chronic.        LABS:    CBC:   Recent Labs     08/20/19  0855 08/21/19  0701 08/22/19  0632   WBC 6.4 6.6 4.7   HGB 12.9 12.4 12.2   HCT 39.3 37.4 36.1    188 174   MCV 96.6 95.3 95.5     Renal:    Recent Labs     08/20/19  0900 08/21/19  0701 08/22/19  0632    139 136   K 4.2 4.0 3.7   CL 99 100 99   CO2 29 28 27   BUN 18 16 13 CREATININE 0.7 0.6 0.6   GLUCOSE 165* 175* 160*   CALCIUM 9.1 8.8 8.6   ANIONGAP 9 11 10     Hepatic:   Recent Labs     08/20/19  0855   AST 18   ALT 13   BILITOT 0.3   BILIDIR <0.2   PROT 7.6   LABALBU 4.3   ALKPHOS 51     Troponin: No results for input(s): TROPONINI in the last 72 hours. BNP: No results for input(s): BNP in the last 72 hours. Lipids: No results for input(s): CHOL, HDL in the last 72 hours. Invalid input(s): LDLCALCU, TRIGLYCERIDE  ABGs:  No results for input(s): PHART, AIU6GDZ, PO2ART, GDS4HMA, BEART, THGBART, O5CVNIND, XWN6KWG in the last 72 hours. INR: No results for input(s): INR in the last 72 hours. Lactate: No results for input(s): LACTATE in the last 72 hours. Cultures:  -----------------------------------------------------------------  RAD:   US RENAL COMPLETE   Final Result      No hydronephrosis      XR CHEST 1 VW   Final Result      Mild bibasilar atelectasis. Stable cardiomegaly. CT ABDOMEN PELVIS W IV CONTRAST   Final Result      Moderate to large diffuse stool burden noted with distention of rectum with stool which may indicate impaction. There is development of moderate bilateral hydronephrosis. This may be due to mass effect of the distal ureters from the rectum. Patchy areas of diminished enhancement noted in the right kidney appears similar to prior study and may be related to prior infarcts but pyelonephritis is not entirely excluded. XR CHEST PORTABLE   Final Result      No focal consolidation. Mild cardiomegaly. Assessment/Plan:   Constipation;improving  Likely 2/2 to immobility, dysautonomia due to corticobulbar degeneration, also has history of hypothyroidism, although she is on synthroid, this may also be contributing. CT with no signs of obstruction.     Has had symptoms in the past but never this severe per daughter.  CT showed moderate to large diffuse stool burden with distention of rectum which could indicate impaction.     Milk and molasses enema earlier today in ED with one solid BM     -s/p enemas, multiple BM  -Continue Senokot-S 1 tablet BID  -TSH 4, increased synthroid to 125mcg  -Takes metamucil, fiber at home.     Hydronephrosis; Urinary incontinence; dysuria; resolved  UA in ED was nl. Was recently treated for E. Coli UTI in July with Keflex, patient finished treatment but continued to have dysuria. Some component likely 2/2 to dysautonomia due to corticobulbar degeneration. CT with no signs of kidney stones. Hydronephrosis is likely secondary to mass effect from the rectum on the distal ureters. BUN: 18. Cr: .7. Previous CT in 2/2018 also with no sign of stones or obstruction.      Clinically, patient denies flank pain, is HDS, no leukocytosis, changes , urine color, UA normal, so pyelonephritis is less likely. Denies urinary retention.     -Urology consulted  Renal U/S  -- No hydronephrosis  -- Removed elena today, will undergo voiding trial for hopeful d/c tomorrow. - started on flomax  -UA nl, UCx pending  -Likely 2/2 some component of dysautonomia Will ctm,     Leg Edema R > L  Has history of chronic lower extremely pitting edema. On chronic xarelto. Last EF was 55-60%. Venous duplex in 2/2018 showed totally occluding superficial venous thrombosis in mid calf area. Denies tenderness, no erythema.     -ctm, since patient is on chronic anticoagulation, VTE is less likely.  -Continue Xarelto     Corticobulbar Degeration  Per neurologist note in 2014 no benefit for medical treatment.  Right-sided hemiparesis continues to slowly worsen. R sided hemiplegia, speech difficulty.  -Cont levodopa TID  - has home services    Morbid obesity  with BMI  49.8 kg/m²      Hx of AFib   - Cont sotalol 40mg daily, Xarelto 15mg QDay     HTN  -Continue IMDUR ER 30mg QDay,  Dyazide 1 capsule QDay,     Hypothyroidism; TSH 4; T4 nl  -cont synthroid     Depression  -cont cymbalta     Will discuss with attending

## 2019-08-22 NOTE — PLAN OF CARE
Problem: Falls - Risk of:  Goal: Absence of physical injury  Description  Absence of physical injury  Note:   At high risk for falls per Rony Eriksson scale. Oriented x 4. Has very limited mobility especially R extremities. Will anticipate needs and assist with ADL. Will continue to monitor and plan of care. Problem: Risk for Impaired Skin Integrity  Goal: Tissue integrity - skin and mucous membranes  Description  Structural intactness and normal physiological function of skin and  mucous membranes. 8/22/2019 0506 by Oscar Kelly RN  Outcome: Ongoing  Note:   Kiran skin scale score 12. Has very limited mobility. Does not want to lie on side. Wants to lie on back. Skin intact. Repositioned in bed with pillow support. Bariatric low air loss specialty mattress ordered for pt. Will continue to monitor and plan of care.

## 2019-08-22 NOTE — PROGRESS NOTES
Previous MBS - none    Chart reviewed. Medical Diagnosis: constipation  Treatment Diagnosis: dysphagia    BSE Impression 8/21/19  pt alert, follows commands, speech is very strained with reduced intelligibilitydue to neurological impairment. 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 She reports pt can eat whatever she wants, does states she has phelgm due to not being able to blow her nose. She reports pt drinks from straw at home. Presented pt with puree and thin liquids as well as a rachna cracker. Pt demonstrated no overt signs of aspiration: no coughing/throat clearing or change in vocal quality. This included 3 ounces of uninterrupted swallows of water via straw. Good laryngeal elevation upon palpation of anterior neck. Pt with slow but adequate mastication of cracker, no oral residue. MBS not able to be completed due to flouro equipment would not accommodate pt shoulder width and no access to FEES at this time.     MBS results- not able to be completed due to flouro equipment would not accommodate pt shoulder width    Pain: denies    Current Diet : regular with thin liquids    Treatment:  Pt seen bedside to address the following goals:  1-The patient will tolerate recommended diet without observed clinical signs of aspiration  8/22: RN states pt had no problems yesterday with PO. and that lungs were clear. Caregiver present who states no problems with swallowing, pt denies any issues. Pt analyzed with water via straw and rachna cracker. No immediate cough, throat clear or change in voice occurred. Pt demonstrates adequate mastication with effective clearance of oral cavity. Pt noted to cough few minutes after presentation of PO. Question whether could be related to reflux or pharyngeal residue.   If there is suspicion for aspiration,  Pt would benefit from FEES study, which could be completed at another [facility] facility

## 2019-08-23 VITALS
HEIGHT: 63 IN | HEART RATE: 87 BPM | DIASTOLIC BLOOD PRESSURE: 82 MMHG | TEMPERATURE: 99.3 F | RESPIRATION RATE: 16 BRPM | BODY MASS INDEX: 47.19 KG/M2 | SYSTOLIC BLOOD PRESSURE: 159 MMHG | WEIGHT: 266.32 LBS | OXYGEN SATURATION: 93 %

## 2019-08-23 LAB
ANION GAP SERPL CALCULATED.3IONS-SCNC: 8 MMOL/L (ref 3–16)
BUN BLDV-MCNC: 16 MG/DL (ref 7–20)
CALCIUM SERPL-MCNC: 9 MG/DL (ref 8.3–10.6)
CHLORIDE BLD-SCNC: 98 MMOL/L (ref 99–110)
CO2: 29 MMOL/L (ref 21–32)
CREAT SERPL-MCNC: 0.8 MG/DL (ref 0.6–1.2)
GFR AFRICAN AMERICAN: >60
GFR NON-AFRICAN AMERICAN: >60
GLUCOSE BLD-MCNC: 166 MG/DL (ref 70–99)
HCT VFR BLD CALC: 37.7 % (ref 36–48)
HEMOGLOBIN: 12.7 G/DL (ref 12–16)
MCH RBC QN AUTO: 32.4 PG (ref 26–34)
MCHC RBC AUTO-ENTMCNC: 33.8 G/DL (ref 31–36)
MCV RBC AUTO: 95.9 FL (ref 80–100)
PDW BLD-RTO: 12.8 % (ref 12.4–15.4)
PLATELET # BLD: 193 K/UL (ref 135–450)
PMV BLD AUTO: 8.8 FL (ref 5–10.5)
POTASSIUM REFLEX MAGNESIUM: 3.9 MMOL/L (ref 3.5–5.1)
RBC # BLD: 3.93 M/UL (ref 4–5.2)
SODIUM BLD-SCNC: 135 MMOL/L (ref 136–145)
WBC # BLD: 5.6 K/UL (ref 4–11)

## 2019-08-23 PROCEDURE — 36415 COLL VENOUS BLD VENIPUNCTURE: CPT

## 2019-08-23 PROCEDURE — 6370000000 HC RX 637 (ALT 250 FOR IP): Performed by: STUDENT IN AN ORGANIZED HEALTH CARE EDUCATION/TRAINING PROGRAM

## 2019-08-23 PROCEDURE — 99238 HOSP IP/OBS DSCHRG MGMT 30/<: CPT | Performed by: INTERNAL MEDICINE

## 2019-08-23 PROCEDURE — 6370000000 HC RX 637 (ALT 250 FOR IP): Performed by: NURSE PRACTITIONER

## 2019-08-23 PROCEDURE — 80048 BASIC METABOLIC PNL TOTAL CA: CPT

## 2019-08-23 PROCEDURE — 85027 COMPLETE CBC AUTOMATED: CPT

## 2019-08-23 PROCEDURE — 2580000003 HC RX 258: Performed by: STUDENT IN AN ORGANIZED HEALTH CARE EDUCATION/TRAINING PROGRAM

## 2019-08-23 PROCEDURE — 51798 US URINE CAPACITY MEASURE: CPT

## 2019-08-23 RX ORDER — PANTOPRAZOLE SODIUM 40 MG/1
40 TABLET, DELAYED RELEASE ORAL
Qty: 30 TABLET | Refills: 3 | Status: SHIPPED | OUTPATIENT
Start: 2019-08-23 | End: 2019-09-11 | Stop reason: SDUPTHER

## 2019-08-23 RX ORDER — TAMSULOSIN HYDROCHLORIDE 0.4 MG/1
0.4 CAPSULE ORAL DAILY
Status: DISCONTINUED | OUTPATIENT
Start: 2019-08-23 | End: 2019-08-23 | Stop reason: HOSPADM

## 2019-08-23 RX ORDER — SENNA AND DOCUSATE SODIUM 50; 8.6 MG/1; MG/1
1 TABLET, FILM COATED ORAL 2 TIMES DAILY
Qty: 60 TABLET | Refills: 1 | Status: SHIPPED | OUTPATIENT
Start: 2019-08-23

## 2019-08-23 RX ORDER — LEVOTHYROXINE SODIUM 0.12 MG/1
125 TABLET ORAL DAILY
Qty: 30 TABLET | Refills: 3 | Status: SHIPPED | OUTPATIENT
Start: 2019-08-23

## 2019-08-23 RX ORDER — TAMSULOSIN HYDROCHLORIDE 0.4 MG/1
0.4 CAPSULE ORAL DAILY
Qty: 30 CAPSULE | Refills: 3 | Status: SHIPPED | OUTPATIENT
Start: 2019-08-23

## 2019-08-23 RX ADMIN — CARBIDOPA AND LEVODOPA 2 TABLET: 25; 100 TABLET ORAL at 15:06

## 2019-08-23 RX ADMIN — ACETAMINOPHEN 650 MG: 325 TABLET ORAL at 09:27

## 2019-08-23 RX ADMIN — CHOLECALCIFEROL TAB 25 MCG (1000 UNIT) 2000 UNITS: 25 TAB at 09:13

## 2019-08-23 RX ADMIN — PANTOPRAZOLE SODIUM 40 MG: 40 TABLET, DELAYED RELEASE ORAL at 04:59

## 2019-08-23 RX ADMIN — Medication 10 ML: at 09:14

## 2019-08-23 RX ADMIN — DULOXETINE HYDROCHLORIDE 60 MG: 30 CAPSULE, DELAYED RELEASE ORAL at 09:13

## 2019-08-23 RX ADMIN — CARBIDOPA AND LEVODOPA 2 TABLET: 25; 100 TABLET ORAL at 09:13

## 2019-08-23 RX ADMIN — SOTALOL HYDROCHLORIDE 80 MG: 80 TABLET ORAL at 09:13

## 2019-08-23 RX ADMIN — LEVOTHYROXINE SODIUM 125 MCG: 125 TABLET ORAL at 04:59

## 2019-08-23 RX ADMIN — ISOSORBIDE MONONITRATE 30 MG: 30 TABLET, EXTENDED RELEASE ORAL at 09:13

## 2019-08-23 RX ADMIN — TRIAMTERENE AND HYDROCHLOROTHIAZIDE 1 TABLET: 37.5; 25 TABLET ORAL at 09:13

## 2019-08-23 RX ADMIN — SENNOSIDES AND DOCUSATE SODIUM 1 TABLET: 8.6; 5 TABLET ORAL at 09:13

## 2019-08-23 RX ADMIN — TAMSULOSIN HYDROCHLORIDE 0.4 MG: 0.4 CAPSULE ORAL at 15:11

## 2019-08-23 ASSESSMENT — PAIN SCALES - GENERAL
PAINLEVEL_OUTOF10: 0
PAINLEVEL_OUTOF10: 5
PAINLEVEL_OUTOF10: 0
PAINLEVEL_OUTOF10: 0

## 2019-08-23 NOTE — PROGRESS NOTES
Urology Progress Note      BP (!) 159/82   Pulse 87   Temp 99.3 °F (37.4 °C) (Axillary)   Resp 16   Ht 5' 3\" (1.6 m)   Wt 266 lb 5.1 oz (120.8 kg)   SpO2 93%   BMI 47.18 kg/m²   Temp  Av.2 °F (37.3 °C)  Min: 98.7 °F (37.1 °C)  Max: 99.5 °F (37.5 °C)    Fay is out. She is voiding better. PVR ~200cc which is acceptable. Will start flomax. Please discharge on flomax. Okay to discharge from our standpoint.  She will follow-up with  in 1-2 weeks       LAKIA Tay - CNP

## 2019-08-23 NOTE — CARE COORDINATION
Case Management Assessment            Discharge Note                    Date / Time of Note: 8/23/2019 4:27 PM                  Discharge Note Completed by: Sandhya Sandoval    Patient Name: Homar Wellington   YOB: 1945  Diagnosis: Constipation [K59.00]  Constipation [K59.00]   Date / Time: 8/20/2019  8:19 AM    Current PCP: Dajuan Mehta MD  Clinic patient: No    Hospitalization in the last 30 days: No      Financial:  Payor: Shantelle Velazquez / Plan: Vannessa Favor / Product Type: *No Product type* /      Pharmacy:    [address] [phone] [facility] [phone]  [address]  Phone: [phone] Fax: [phone] [facility] [address] - [place] C[place], [address] [facility] [phone] - F [address] [facility] [address]  [place] [address]  Phone: [phone] Fax: [phone] [facility], [address] [facility] [phone] - F [phone]  [facility] [address]  Phone: [phone] Fax: [phone] [facility]. [address] [phone] [facility] [phone]  [facility] [address] [address]  Phone: [phone] Fax: [phone]      Assistance purchasing medications?: Potential Assistance Purchasing Medications: No  Assistance provided by Case Management: None at this time    Does patient want to participate in local refill/ meds to beds program?: No    Meds To Beds General Rules:  1. Can ONLY be done Monday- Friday between 8:30am-5pm  2. Prescription(s) must be in pharmacy by 3pm to be filled same day  3. Copy of patient's insurance/ prescription drug card and patient face sheet must be sent along with the prescription(s)  4. Cost of Rx cannot be added to hospital bill. If financial assistance is needed, please contact unit  or ;   or Social
Spoke with patient at bedside, she gave her daughter India's number for discharge needs. Pt lives at home with daughter and has 24 hour care at this time, has nurse when daughter is working. Pt's nurse in the room, states pt is bed/ wheelchair bound, does not anticipate any changes upon discharge. CM called Noemi How [phone], left message to call when available. Will continue to follow for discharge planning.     Bakari Carrillo, RN, BSN,   4th Floor Progressive Care Unit  [phone]
Daughter, Gee Painting [phone], lives with patient and has private nursing care when Gee Painting is not home, states she has all equipment needed to take care of the patient. Gee Painting states they will need stretcher/EMS transport at discharge due to pt being bed bound. Will continue to follow for discharge needs. Arron Roca and her family were provided with choice of provider; she and her family are in agreement with the discharge plan.     Care Transition patient: Melanie Su RN  [facility]  Case Management Department  Ph: [phone]   Fax: [phone]

## 2019-08-25 NOTE — DISCHARGE SUMMARY
Patient ID: Pati Stanley      Patient's PCP: Madeleine Hall MD    Admit Date: 8/20/2019     Discharge Date: 8/24/2019    Attending Physician: Madeleine Hall MD    Discharge Physician: Madeleine Hall     Primary Diagnosis: 8/23/2019    Active Problems: Morbid obesity with BMI of 40.0-44.9, adult ([address]    Corticobasal degeneration    Constipation    History of pulmonary embolism    Urinary retention    Bilateral hydronephrosis  Resolved Problems:    * No resolved hospital problems. [facility] Course: Patient arrived to the hospital with complaint of constipation. She was found to have some abdominal swelling with severe constipation and and combining hydronephrosis, bilateral likely secondary to pressure from stool burden. Patient was admitted for enema and laxative treatment to resolve her constipation and monitoring bilateral hydronephrosis. She was treated with enemas and laxative with good bowel movement. Hydronephrosis resolved per ultrasound. Kidney function remained preserved with good urinary function patient is discharged home on Flomax and will follow-up with her urologist    PE Exam:   Vitals: BP (!) 159/82   Pulse 87   Temp 99.3 °F (37.4 °C) (Axillary)   Resp 16   Ht 5' 3\" (1.6 m)   Wt 266 lb 5.1 oz (120.8 kg)   SpO2 93%   BMI 47.18 kg/m²   I/O : No intake or output data in the 24 hours ending 08/24/19 2246    General appearance: alert, appears stated age and cooperative  HEENT: Patient with left eye gaze deviation.   Otherwise normal scalp and head  Neck: no JVD and supple, symmetrical, trachea midline  Lungs: clear to auscultation bilaterally  Heart: regular rate and rhythm, S1, S2 normal, no murmur, click, rub or gallop  Abdomen: soft, non-tender; bowel sounds normal; no masses,  no organomegaly and mild- mod swelling  Extremities: extremities normal, atraumatic, no cyanosis or edema and Homans sign is negative, no sign of DVT  Lymphatic: No significant lymph node enlargement papable  Neurologic: Mental status: Alert, oriented, thought content appropriate, severe weakness of extremities mainly left side    Consults: urology    Significant Diagnostic Studies: Renal ultrasound, abdominal CT    RADIOLOGY:  US RENAL COMPLETE   Final Result      No hydronephrosis      XR CHEST 1 VW   Final Result      Mild bibasilar atelectasis. Stable cardiomegaly. CT ABDOMEN PELVIS W IV CONTRAST   Final Result      Moderate to large diffuse stool burden noted with distention of rectum with stool which may indicate impaction. There is development of moderate bilateral hydronephrosis. This may be due to mass effect of the distal ureters from the rectum. Patchy areas of diminished enhancement noted in the right kidney appears similar to prior study and may be related to prior infarcts but pyelonephritis is not entirely excluded. XR CHEST PORTABLE   Final Result      No focal consolidation. Mild cardiomegaly. Disposition: home    Discharged Condition: Stable    Follow Up: Primary Care Physician in two weeks    PCP to follow up:   1.  Cr    Discharge Medications:   [facility] Med[facility] IET:[id number]    Printed on:08/24/19 3633   Medication Information                      acetaminophen (TYLENOL 8 HOUR) 650 MG extended release tablet  Take 1,300 mg by mouth every 6 hours as needed for Pain             atorvastatin (LIPITOR) 40 MG tablet  Take 1 tablet by mouth daily             Calcium Carb-Cholecalciferol (CALCIUM + D3 PO)  Take 1 each by mouth 2 times daily              CANNABIDIOL PO  Take 15,000 mg by mouth 3 times daily             carbidopa-levodopa (SINEMET)  MG per tablet  TAKE 2 TABLETS BY MOUTH THREE TIMES DAILY             Cholecalciferol 2000 units CAPS  Take 1 capsule by mouth 2 times daily             diphenhydrAMINE (BENADRYL) 25 MG tablet  Take 25 mg by mouth 2 times daily DULoxetine (CYMBALTA) 60 MG extended release capsule  TAKE 1 CAPSULE DAILY             FIBER ADULT GUMMIES PO  Take 1 each by mouth daily             isosorbide mononitrate (IMDUR) 30 MG extended release tablet  Take 1 tablet by mouth daily             levothyroxine (SYNTHROID) 125 MCG tablet  Take 1 tablet by mouth daily             Misc.  Devices (WHEELCHAIR CUSHION) MISC  1 each by Does not apply route daily             Multiple Vitamins-Minerals (EQ ONE DAILY WOM[employer]) TABS  TAKE ONE TABLET BY MOUTH ONCE DAILY             Omega-3 Fatty Acids (FISH OIL) 1200 MG CPDR  Take 1 each by mouth daily              pantoprazole (PROTONIX) 40 MG tablet  Take 1 tablet by mouth every morning (before breakfast)             Psyllium (METAMUCIL) 28.3 % POWD  Take by mouth 2 times daily             sennosides-docusate sodium (SENOKOT-S) 8.6-50 MG tablet  Take 1 tablet by mouth 2 times daily             sotalol (BETAPACE) 80 MG tablet  Take 1 tablet by mouth daily             tamsulosin (FLOMAX) 0.4 MG capsule  Take 1 capsule by mouth daily             triamterene-hydrochlorothiazide (DYAZIDE) 37.5-25 MG per capsule  TAKE 1 CAPSULE DAILY             XARELTO 15 MG TABS tablet  TAKE 1 TABLET DAILY WITH SUPPER                  Activity: activity as tolerated    Diet: regular diet    Wound Care: none needed    Time Spent on discharge is more than 20 minutes    Aarti Parra   Pager [phone]  8/24/2019

## 2019-08-26 ENCOUNTER — TELEPHONE (OUTPATIENT)
Dept: INTERNAL MEDICINE CLINIC | Age: 74
End: 2019-08-26

## 2019-08-26 RX ORDER — LEVOTHYROXINE SODIUM 112 UG/1
TABLET ORAL
Qty: 90 TABLET | Refills: 4 | Status: SHIPPED | OUTPATIENT
Start: 2019-08-26

## 2019-08-26 RX ORDER — SOTALOL HYDROCHLORIDE 80 MG/1
TABLET ORAL
Qty: 90 TABLET | Refills: 4 | Status: SHIPPED | OUTPATIENT
Start: 2019-08-26

## 2019-08-26 NOTE — TELEPHONE ENCOUNTER
Bar[facility] Transitions Initial Follow Up Call    Outreach made within 2 business days of discharge: Yes    Patient: Anny Shelley Patient : 1945   MRN: [id number]  Reason for Admission: There are no discharge diagnoses documented for the most recent discharge. Discharge Date: 19       Spoke with: Angella-Nurse    Discharge department/facility: [facility]    TCM Interactive Patient Contact:  Was patient able to fill all prescriptions: Yes  Was patient instructed to bring all medications to the follow-up visit: Yes  Is patient taking all medications as directed in the discharge summary?  Yes  Does patient understand their discharge instructions: Yes  Does patient have questions or concerns that need addressed prior to 7-14 day follow up office visit: no    Scheduled appointment with PCP within 7-14 days    Follow Up  Future Appointments   Date Time Provider Kristen Hernandes   2019  4:15 PM MD HAVEN Lopez 111 IM [facility]   2019 11:00 AM MD HAVEN Lopez 111 IM [facility]   2019  2:15 PM Sandy Beckham MD [place] [address] [address] [place], [place]

## 2019-09-04 ENCOUNTER — OFFICE VISIT (OUTPATIENT)
Dept: INTERNAL MEDICINE CLINIC | Age: 74
End: 2019-09-04
Payer: MEDICARE

## 2019-09-04 VITALS — SYSTOLIC BLOOD PRESSURE: 132 MMHG | HEART RATE: 74 BPM | DIASTOLIC BLOOD PRESSURE: 68 MMHG | OXYGEN SATURATION: 93 %

## 2019-09-04 DIAGNOSIS — K59.01 SLOW TRANSIT CONSTIPATION: Primary | ICD-10-CM

## 2019-09-04 DIAGNOSIS — R09.89 PHLEGM IN THROAT: ICD-10-CM

## 2019-09-04 PROCEDURE — 99214 OFFICE O/P EST MOD 30 MIN: CPT | Performed by: INTERNAL MEDICINE

## 2019-09-04 PROCEDURE — 1111F DSCHRG MED/CURRENT MED MERGE: CPT | Performed by: INTERNAL MEDICINE

## 2019-09-04 RX ORDER — GUAIFENESIN 600 MG/1
600 TABLET, EXTENDED RELEASE ORAL 2 TIMES DAILY
Qty: 30 TABLET | Refills: 0 | Status: SHIPPED | OUTPATIENT
Start: 2019-09-04 | End: 2019-09-19

## 2019-09-04 RX ORDER — CETIRIZINE HYDROCHLORIDE 10 MG/1
10 TABLET ORAL DAILY
COMMUNITY

## 2019-09-04 ASSESSMENT — ENCOUNTER SYMPTOMS
NAUSEA: 0
SHORTNESS OF BREATH: 0
VOMITING: 0
ABDOMINAL PAIN: 0
CHEST TIGHTNESS: 0
CONSTIPATION: 1
BLOOD IN STOOL: 0

## 2019-09-04 NOTE — PROGRESS NOTES
Take 1 tablet by mouth 2 times daily for 15 days 9/4/19 9/19/19 Yes Shannon Capps MD   levothyroxine (SYNTHROID) 112 MCG tablet TAKE 1 TABLET DAILY 8/26/19  Yes Shannon Capps MD   sotalol (BETAPACE) 80 MG tablet TAKE 1 TABLET DAILY 8/26/19  Yes Shannon Capps MD   sennosides-docusate sodium (SENOKOT-S) 8.6-50 MG tablet Take 1 tablet by mouth 2 times daily 8/23/19  Yes Robert Pineda MD   levothyroxine (SYNTHROID) 125 MCG tablet Take 1 tablet by mouth daily 8/23/19  Yes Robert Pineda MD   pantoprazole (PROTONIX) 40 MG tablet Take 1 tablet by mouth every morning (before breakfast) 8/23/19  Yes Robert Pineda MD   tamsulosin [facility]) 0.4 MG capsule Take 1 capsule by mouth daily 8/23/19  Yes Robert Pineda MD   acetaminophen (TYLENOL 8 HOUR) 650 MG extended release tablet Take 1,300 mg by mouth every 6 hours as needed for Pain   Yes Historical Provider, MD   Cholecalciferol 2000 units CAPS Take 1 capsule by mouth 2 times daily   Yes Historical Provider, MD   CANNABIDIOL PO Take 15,000 mg by mouth 3 times daily   Yes Historical Provider, MD   diphenhydrAMINE (BENADRYL) 25 MG tablet Take 50 mg by mouth 2 times daily    Yes Historical Provider, MD   carbidopa-levodopa (SINEMET)  MG per tablet TAKE 2 TABLETS BY MOUTH THREE TIMES DAILY 6/12/19  Yes Historical Provider, MD   XARELTO 15 MG TABS tablet TAKE 1 TABLET DAILY WITH SUPPER 3/24/19  Yes Shannon Capps MD   DULoxetine (CYMBALTA) 60 MG extended release capsule TAKE 1 CAPSULE DAILY 3/21/19  Yes Shannon Capps MD   triamterene-hydrochlorothiazide (DYAZIDE) 37.5-25 MG per capsule TAKE 1 CAPSULE DAILY 9/17/18  Yes Shannon Capps MD   isosorbide mononitrate (IMDUR) 30 MG extended release tablet Take 1 tablet by mouth daily 9/17/18  Yes Shannon Capps MD   atorvastatin (LIPITOR) 40 MG tablet Take 1 tablet by mouth daily 9/17/18  Yes Shannon Capps MD   Psyllium (METAMUCIL) 28.3 % POWD Take by mouth 2 times

## 2019-09-04 NOTE — PATIENT INSTRUCTIONS
the label. · Schedule time each day for a bowel movement. A daily routine may help. Take your time having your bowel movement. · Support your feet with a small step stool when you sit on the toilet. This helps flex your hips and places your pelvis in a squatting position. · Your doctor may recommend an over-the-counter laxative to relieve your constipation. Examples are Milk of Magnesia and MiraLax. Read and follow all instructions on the label. Do not use laxatives on a long-term basis. When should you call for help? Call your doctor now or seek immediate medical care if:    · You have new or worse belly pain.     · You have new or worse nausea or vomiting.     · You have blood in your stools.    Watch closely for changes in your health, and be sure to contact your doctor if:    · Your constipation is getting worse.     · You do not get better as expected. Where can you learn more? Go to https://[employer]ol[employer]. org and sign in to your [employer] account. Enter 21 [phone] in the [employer] box to learn more about \"Constipation: Care Instructions. \"     If you do not have an account, please click on the \"Sign Up Now\" link. Current as of: September 23, 2018  Content Version: 12.1  © 5663-0212 Healthwise, Inc[employer]. Care instructions adapted under license by [facility] ([facility]). If you have questions about a medical condition or this instruction, always ask your healthcare professional. [place] [address] any warranty or liability for your use of this information.

## 2019-09-05 ENCOUNTER — HOSPITAL ENCOUNTER (EMERGENCY)
Age: 74
Discharge: HOME | End: 2019-09-05
Attending: EMERGENCY MEDICINE
Payer: MEDICARE

## 2019-09-05 ENCOUNTER — APPOINTMENT (OUTPATIENT)
Dept: GENERAL RADIOLOGY | Age: 74
End: 2019-09-05
Payer: MEDICARE

## 2019-09-05 ENCOUNTER — APPOINTMENT (OUTPATIENT)
Dept: CT IMAGING | Age: 74
End: 2019-09-05
Payer: MEDICARE

## 2019-09-05 ENCOUNTER — TELEPHONE (OUTPATIENT)
Dept: INTERNAL MEDICINE CLINIC | Age: 74
End: 2019-09-05

## 2019-09-05 DIAGNOSIS — K59.00 CONSTIPATION, UNSPECIFIED CONSTIPATION TYPE: Primary | ICD-10-CM

## 2019-09-05 DIAGNOSIS — R10.10 PAIN OF UPPER ABDOMEN: ICD-10-CM

## 2019-09-05 LAB
ANION GAP SERPL CALCULATED.3IONS-SCNC: 11 MMOL/L (ref 3–16)
BASOPHILS ABSOLUTE: 0 K/UL (ref 0–0.2)
BASOPHILS RELATIVE PERCENT: 0.4 %
BILIRUBIN URINE: NEGATIVE
BLOOD, URINE: ABNORMAL
BUN BLDV-MCNC: 21 MG/DL (ref 7–20)
CALCIUM SERPL-MCNC: 8.6 MG/DL (ref 8.3–10.6)
CHLORIDE BLD-SCNC: 100 MMOL/L (ref 99–110)
CLARITY: CLEAR
CO2: 26 MMOL/L (ref 21–32)
COLOR: YELLOW
CREAT SERPL-MCNC: 0.7 MG/DL (ref 0.6–1.2)
EOSINOPHILS ABSOLUTE: 0.2 K/UL (ref 0–0.6)
EOSINOPHILS RELATIVE PERCENT: 2.6 %
GFR AFRICAN AMERICAN: >60
GFR NON-AFRICAN AMERICAN: >60
GLUCOSE BLD-MCNC: 220 MG/DL (ref 70–99)
GLUCOSE URINE: NEGATIVE MG/DL
HCT VFR BLD CALC: 37.3 % (ref 36–48)
HEMOGLOBIN: 12.3 G/DL (ref 12–16)
KETONES, URINE: NEGATIVE MG/DL
LEUKOCYTE ESTERASE, URINE: ABNORMAL
LYMPHOCYTES ABSOLUTE: 1.5 K/UL (ref 1–5.1)
LYMPHOCYTES RELATIVE PERCENT: 19.6 %
MCH RBC QN AUTO: 31.8 PG (ref 26–34)
MCHC RBC AUTO-ENTMCNC: 33.1 G/DL (ref 31–36)
MCV RBC AUTO: 96.1 FL (ref 80–100)
MICROSCOPIC EXAMINATION: YES
MONOCYTES ABSOLUTE: 0.5 K/UL (ref 0–1.3)
MONOCYTES RELATIVE PERCENT: 6.7 %
NEUTROPHILS ABSOLUTE: 5.4 K/UL (ref 1.7–7.7)
NEUTROPHILS RELATIVE PERCENT: 70.7 %
NITRITE, URINE: NEGATIVE
PDW BLD-RTO: 12.9 % (ref 12.4–15.4)
PH UA: 6.5 (ref 5–8)
PLATELET # BLD: 221 K/UL (ref 135–450)
PMV BLD AUTO: 8.9 FL (ref 5–10.5)
POTASSIUM SERPL-SCNC: 4.6 MMOL/L (ref 3.5–5.1)
PROTEIN UA: 30 MG/DL
RBC # BLD: 3.88 M/UL (ref 4–5.2)
RBC UA: ABNORMAL /HPF (ref 0–2)
SODIUM BLD-SCNC: 137 MMOL/L (ref 136–145)
SPECIFIC GRAVITY UA: 1.02 (ref 1–1.03)
URINE TYPE: ABNORMAL
UROBILINOGEN, URINE: 0.2 E.U./DL
WBC # BLD: 7.6 K/UL (ref 4–11)
WBC UA: >100 /HPF (ref 0–5)

## 2019-09-05 PROCEDURE — 6370000000 HC RX 637 (ALT 250 FOR IP): Performed by: EMERGENCY MEDICINE

## 2019-09-05 PROCEDURE — 87077 CULTURE AEROBIC IDENTIFY: CPT

## 2019-09-05 PROCEDURE — 74177 CT ABD & PELVIS W/CONTRAST: CPT

## 2019-09-05 PROCEDURE — 81001 URINALYSIS AUTO W/SCOPE: CPT

## 2019-09-05 PROCEDURE — 87086 URINE CULTURE/COLONY COUNT: CPT

## 2019-09-05 PROCEDURE — 87186 SC STD MICRODIL/AGAR DIL: CPT

## 2019-09-05 PROCEDURE — 74022 RADEX COMPL AQT ABD SERIES: CPT

## 2019-09-05 PROCEDURE — 99284 EMERGENCY DEPT VISIT MOD MDM: CPT

## 2019-09-05 PROCEDURE — 80048 BASIC METABOLIC PNL TOTAL CA: CPT

## 2019-09-05 PROCEDURE — 85025 COMPLETE CBC W/AUTO DIFF WBC: CPT

## 2019-09-05 PROCEDURE — 6360000004 HC RX CONTRAST MEDICATION: Performed by: EMERGENCY MEDICINE

## 2019-09-05 PROCEDURE — 2500000003 HC RX 250 WO HCPCS: Performed by: EMERGENCY MEDICINE

## 2019-09-05 RX ORDER — SODIUM PHOSPHATE,MONO-DIBASIC 19G-7G/118
1 ENEMA (ML) RECTAL ONCE
Status: COMPLETED | OUTPATIENT
Start: 2019-09-05 | End: 2019-09-05

## 2019-09-05 RX ADMIN — IOPAMIDOL 80 ML: 755 INJECTION, SOLUTION INTRAVENOUS at 16:19

## 2019-09-05 RX ADMIN — ENEMA 1 ENEMA: 19; 7 ENEMA RECTAL at 16:53

## 2019-09-05 RX ADMIN — Medication 960 ML: at 22:04

## 2019-09-05 RX ADMIN — MAGNESIUM CITRATE 296 ML: 1.75 LIQUID ORAL at 16:28

## 2019-09-05 ASSESSMENT — ENCOUNTER SYMPTOMS
NAUSEA: 0
CHEST TIGHTNESS: 0
ABDOMINAL PAIN: 0
CONSTIPATION: 1
SHORTNESS OF BREATH: 0
VOMITING: 0

## 2019-09-05 NOTE — ED PROVIDER NOTES
Date of evaluation: 9/5/2019    ADDENDUM:      Care of this patient was assumed from Dr Nehal Zuñiga. The patient was seen for Constipation  . The patient's initial evaluation and plan have been discussed with the prior provider who initially evaluated the patient. Nursing Notes, Past Medical Hx, Past Surgical Hx, Social Hx, Allergies, and Family Hx were all reviewed. Diagnostic Results         RADIOLOGY:  CT ABDOMEN PELVIS W IV CONTRAST Additional Contrast? None   Final Result      1. Rectum distended with stool measuring up to 10 cm in diameter likely representing fecal impaction, not significantly changed compared to 8/20/2019. Circumferential wall thickening involving the distal rectum may represent proctitis. Cannot exclude    malignancy. Small and large bowel loops are otherwise nondilated. 2. Nonobstructing right renal calculus. Previously described hydronephrosis has resolved. XR Acute Abd Series Chest 1 VW   Final Result   Impression: Study limitations secondary to patient habitus. Nonspecific bowel gas pattern. No discrete evidence for small bowel obstruction. If clinical concern persists, CT is recommended due to patient habitus. Moderate to large volume fecal material distally within the colon.             LABS:   Labs Reviewed   URINE CULTURE - Abnormal; Notable for the following components:       Result Value    Organism Escherichia coli (*)     All other components within normal limits    Narrative:     ORDER#: [id number]                          ORDERED BY: Dulce Maria Goff  SOURCE: Urine Clean Catch                  COLLECTED:  09/05/19 22:58  ANTIBIOTICS AT [place].:                      RECEIVED :  09/06/19 01:26  Performed at:  [address] [address]   Phone [phone]   CBC WITH AUTO DIFFERENTIAL - Abnormal; Notable for the following components:    RBC 3.88 (*)     All other components within normal limits

## 2019-09-05 NOTE — ED TRIAGE NOTES
Pt to ed c/o constipation for the past week.  Pt went to her dr yesterday who sent her home with medication and said if she doesn't have a BM by today to come into the ER for possible bowel obstruction

## 2019-09-05 NOTE — ED PROVIDER NOTES
[address] ENCOUNTER          ATTENDING PHYSICIAN NOTE       Date of evaluation: 9/5/2019    Chief Complaint     Constipation      History of Present Illness     Yokasta Tracy is a 76 y.o. female who presents with constipation, last bowel movement was 8 days ago. Patient was admitted August 20 to August 24 for constipation that led to bilateral hydronephrosis due to distended loops of bowel. She was placed on a bowel regimen and eventually had a normal bowel movement while inpatient. Since discharge from the hospital, she has been taking senna, stool softener every other day, several over-the-counter laxatives with no relief. Patient saw primary care doctor yesterday for follow-up appointment after the admission she reported no bowel movement. She was started on medication similar to Linzess, she took 1 dose last night and 1 dose this morning. She still has yet to have a bowel movement. Patient was advised to come in to the ER by his primary care doctor to rule out obstruction. Patient is passing gas. She also has not urinated since last night at 9 PM.  She denies any abdominal pain, nausea, vomiting, fevers or chills     Review of Systems     Review of Systems   Constitutional: Negative for activity change, appetite change, chills and fever. Respiratory: Negative for chest tightness and shortness of breath. Cardiovascular: Negative for chest pain. Gastrointestinal: Positive for constipation. Negative for abdominal pain, nausea and vomiting. Genitourinary:        Decreased urination    Musculoskeletal: Negative. Skin: Negative. Neurological: Negative for dizziness and syncope. Past Medical, Surgical, Family, and Social History     She has a past medical history of Acid reflux, CAD (coronary artery disease), Corticobasal degeneration, Depression, Hyperlipidemia, Hypertension, MI, old, Pulmonary emboli ([address] and Thyroid disease.   She has a past

## 2019-09-05 NOTE — ED NOTES
Pt cleaned up, one 4 inch long piece of stool on was produced      Rodrigo Emanuel RN  09/05/19 9070

## 2019-09-06 NOTE — ED NOTES
Patient prepared for and ready to be discharged. Dressed in clothes and given belongings. IV removed, pt tolerated well, no complications. Patient discharged at this time in no acute distress after Patient verbalized understanding of discharge instructions. Reviewed medications, and when to return to the ED with patient. Encouraged follow up with PCP  Patient taken home via [address].        [address] [facility] [address], RN  09/05/19 8821

## 2019-09-07 LAB
ORGANISM: ABNORMAL
URINE CULTURE, ROUTINE: ABNORMAL

## 2019-09-09 VITALS
BODY MASS INDEX: 44.3 KG/M2 | DIASTOLIC BLOOD PRESSURE: 79 MMHG | RESPIRATION RATE: 17 BRPM | HEIGHT: 63 IN | HEART RATE: 79 BPM | TEMPERATURE: 98.3 F | WEIGHT: 250 LBS | OXYGEN SATURATION: 97 % | SYSTOLIC BLOOD PRESSURE: 149 MMHG

## 2019-09-11 RX ORDER — PANTOPRAZOLE SODIUM 40 MG/1
TABLET, DELAYED RELEASE ORAL
Qty: 90 TABLET | Refills: 4 | Status: SHIPPED | OUTPATIENT
Start: 2019-09-11

## 2019-09-13 ENCOUNTER — TELEPHONE (OUTPATIENT)
Dept: INTERNAL MEDICINE CLINIC | Age: 74
End: 2019-09-13

## 2019-09-16 DIAGNOSIS — I10 HYPERTENSION, UNSPECIFIED TYPE: ICD-10-CM

## 2019-09-16 DIAGNOSIS — I25.10 CORONARY ARTERY DISEASE INVOLVING NATIVE CORONARY ARTERY OF NATIVE HEART WITHOUT ANGINA PECTORIS: ICD-10-CM

## 2019-09-16 RX ORDER — LISINOPRIL 40 MG/1
TABLET ORAL
Qty: 90 TABLET | Refills: 4 | OUTPATIENT
Start: 2019-09-16

## 2019-09-18 RX ORDER — DULOXETIN HYDROCHLORIDE 60 MG/1
CAPSULE, DELAYED RELEASE ORAL
Qty: 90 CAPSULE | Refills: 4 | Status: SHIPPED | OUTPATIENT
Start: 2019-09-18

## 2019-09-20 DIAGNOSIS — I26.09 OTHER ACUTE PULMONARY EMBOLISM WITH ACUTE COR PULMONALE (HCC): ICD-10-CM

## 2019-09-20 RX ORDER — RIVAROXABAN 15 MG/1
TABLET, FILM COATED ORAL
Qty: 90 TABLET | Refills: 4 | Status: SHIPPED | OUTPATIENT
Start: 2019-09-20

## 2019-09-20 NOTE — TELEPHONE ENCOUNTER
Pt daughter stated the following:  Pt brought POA paperwork in to office. Pt daughter needs a letter stating pt is incapable of taking care duties of of everyday living included finances.   Pt daughter dropped off [facility] paperwork to be filled out  Please call

## 2019-09-26 DIAGNOSIS — I10 HYPERTENSION, UNSPECIFIED TYPE: ICD-10-CM

## 2019-09-26 DIAGNOSIS — E78.2 MIXED HYPERLIPIDEMIA: ICD-10-CM

## 2019-09-26 DIAGNOSIS — I25.10 CORONARY ARTERY DISEASE INVOLVING NATIVE CORONARY ARTERY OF NATIVE HEART WITHOUT ANGINA PECTORIS: ICD-10-CM

## 2019-09-26 RX ORDER — TRIAMTERENE AND HYDROCHLOROTHIAZIDE 37.5; 25 MG/1; MG/1
CAPSULE ORAL
Qty: 90 CAPSULE | Refills: 4 | Status: SHIPPED | OUTPATIENT
Start: 2019-09-26

## 2019-09-26 RX ORDER — ATORVASTATIN CALCIUM 40 MG/1
TABLET, FILM COATED ORAL
Qty: 90 TABLET | Refills: 4 | Status: SHIPPED | OUTPATIENT
Start: 2019-09-26

## 2019-10-01 ENCOUNTER — TELEPHONE (OUTPATIENT)
Dept: INTERNAL MEDICINE CLINIC | Age: 74
End: 2019-10-01

## 2019-10-02 ENCOUNTER — TELEPHONE (OUTPATIENT)
Dept: INTERNAL MEDICINE CLINIC | Age: 74
End: 2019-10-02

## 2019-10-30 DIAGNOSIS — I10 HYPERTENSION, UNSPECIFIED TYPE: ICD-10-CM

## 2019-10-30 DIAGNOSIS — I25.10 CORONARY ARTERY DISEASE INVOLVING NATIVE CORONARY ARTERY OF NATIVE HEART WITHOUT ANGINA PECTORIS: ICD-10-CM

## 2019-10-30 RX ORDER — ISOSORBIDE MONONITRATE 30 MG/1
TABLET, EXTENDED RELEASE ORAL
Qty: 90 TABLET | Refills: 1 | OUTPATIENT
Start: 2019-10-30